# Patient Record
Sex: FEMALE | Race: WHITE | NOT HISPANIC OR LATINO | URBAN - NONMETROPOLITAN AREA
[De-identification: names, ages, dates, MRNs, and addresses within clinical notes are randomized per-mention and may not be internally consistent; named-entity substitution may affect disease eponyms.]

---

## 2020-06-04 ENCOUNTER — OFFICE VISIT (OUTPATIENT)
Dept: URBAN - NONMETROPOLITAN AREA CLINIC 2 | Facility: CLINIC | Age: 60
End: 2020-06-04

## 2020-06-09 ENCOUNTER — WEB ENCOUNTER (OUTPATIENT)
Dept: URBAN - NONMETROPOLITAN AREA CLINIC 2 | Facility: CLINIC | Age: 60
End: 2020-06-09

## 2020-06-11 ENCOUNTER — TELEPHONE ENCOUNTER (OUTPATIENT)
Dept: URBAN - NONMETROPOLITAN AREA CLINIC 2 | Facility: CLINIC | Age: 60
End: 2020-06-11

## 2020-06-11 RX ORDER — FAMOTIDINE 40 MG/1
TAKE 1 TABLET (40 MG) BY ORAL ROUTE 2 TIMES PER DAY TABLET ORAL 2
Qty: 180 TABLET | Refills: 3

## 2020-06-29 ENCOUNTER — OFFICE VISIT (OUTPATIENT)
Dept: URBAN - NONMETROPOLITAN AREA CLINIC 2 | Facility: CLINIC | Age: 60
End: 2020-06-29

## 2020-06-29 ENCOUNTER — OFFICE VISIT (OUTPATIENT)
Dept: URBAN - NONMETROPOLITAN AREA CLINIC 2 | Facility: CLINIC | Age: 60
End: 2020-06-29
Payer: MEDICARE

## 2020-06-29 DIAGNOSIS — K21.9 GERD (GASTROESOPHAGEAL REFLUX DISEASE): ICD-10-CM

## 2020-06-29 DIAGNOSIS — Z12.11 ROUTINE COLON: ICD-10-CM

## 2020-06-29 DIAGNOSIS — R19.7 DIARRHEA: ICD-10-CM

## 2020-06-29 PROCEDURE — 3017F COLORECTAL CA SCREEN DOC REV: CPT | Performed by: INTERNAL MEDICINE

## 2020-06-29 PROCEDURE — G9903 PT SCRN TBCO ID AS NON USER: HCPCS | Performed by: INTERNAL MEDICINE

## 2020-06-29 PROCEDURE — 1036F TOBACCO NON-USER: CPT | Performed by: INTERNAL MEDICINE

## 2020-06-29 PROCEDURE — G8427 DOCREV CUR MEDS BY ELIG CLIN: HCPCS | Performed by: INTERNAL MEDICINE

## 2020-06-29 PROCEDURE — 99213 OFFICE O/P EST LOW 20 MIN: CPT | Performed by: INTERNAL MEDICINE

## 2020-06-29 RX ORDER — RIFAXIMIN 550 MG/1
1 TABLET TABLET ORAL THREE TIMES A DAY
Qty: 42 TABLET | Refills: 2 | OUTPATIENT
Start: 2020-06-29 | End: 2020-08-10

## 2020-06-29 RX ORDER — FLUTICASONE PROPIONATE 50 UG/1
SPRAY 2 SPRAYS (100 MCG) IN EACH NOSTRIL BY INTRANASAL ROUTE ONCE DAILY AS NEEDED SPRAY, METERED NASAL 1
Qty: 1 | Refills: 0 | Status: ACTIVE | COMMUNITY
Start: 1900-01-01

## 2020-06-29 RX ORDER — CLINDAMYCIN HYDROCHLORIDE 300 MG/1
TAKE 2 CAPSULES BY MOUTH BEFORE PROCEDURE CAPSULE ORAL
Qty: 0 | Refills: 0 | Status: ACTIVE | COMMUNITY
Start: 1900-01-01

## 2020-06-29 RX ORDER — DEXTROSE 4 G
TAKE 1 TABLET (10 MG) BY ORAL ROUTE ONCE DAILY TABLET,CHEWABLE ORAL 1
Qty: 0 | Refills: 0 | Status: ACTIVE | COMMUNITY
Start: 1900-01-01

## 2020-06-29 RX ORDER — AMLODIPINE BESYLATE 5 MG/1
TAKE 1 TABLET (5 MG) BY ORAL ROUTE ONCE DAILY TABLET ORAL 1
Qty: 0 | Refills: 0 | Status: ACTIVE | COMMUNITY
Start: 1900-01-01

## 2020-06-29 RX ORDER — METFORMIN HYDROCHLORIDE 1000 MG/1
TAKE 1 TABLET (1,000 MG) BY ORAL ROUTE 2 TIMES PER DAY WITH MORNING AND EVENING MEALS TABLET, COATED ORAL 2
Qty: 0 | Refills: 0 | Status: ACTIVE | COMMUNITY
Start: 1900-01-01

## 2020-06-29 RX ORDER — OMEPRAZOLE 40 MG/1
TAKE 1 CAPSULE BY ORAL ROUTE EVERY OTHER DAY CAPSULE, DELAYED RELEASE PELLETS ORAL
Qty: 0 | Refills: 0 | Status: ACTIVE | COMMUNITY
Start: 1900-01-01

## 2020-06-29 RX ORDER — FAMOTIDINE 40 MG/1
TAKE 1 TABLET (40 MG) BY ORAL ROUTE 2 TIMES PER DAY TABLET ORAL 2
Qty: 180 TABLET | Refills: 3 | Status: ACTIVE | COMMUNITY

## 2020-06-29 RX ORDER — MULTIVITAMIN WITH IRON
TAKE 1 TABLET BY ORAL ROUTE DAILY TABLET ORAL 1
Qty: 0 | Refills: 0 | Status: ACTIVE | COMMUNITY
Start: 1900-01-01

## 2020-06-29 RX ORDER — ACETAMINOPHEN 500 MG/1
TABLET, FILM COATED ORAL
Qty: 0 | Refills: 0 | Status: ACTIVE | COMMUNITY
Start: 1900-01-01

## 2020-06-29 RX ORDER — ATORVASTATIN CALCIUM 20 MG/1
TAKE 1 TABLET (20 MG) BY ORAL ROUTE ONCE DAILY TABLET, FILM COATED ORAL 1
Qty: 0 | Refills: 0 | Status: ACTIVE | COMMUNITY
Start: 1900-01-01

## 2020-06-29 RX ORDER — ASPIRIN 81 MG/1
TAKE 1 TABLET (81 MG) BY ORAL ROUTE ONCE DAILY TABLET, COATED ORAL 1
Qty: 0 | Refills: 0 | Status: ACTIVE | COMMUNITY
Start: 1900-01-01

## 2020-06-29 RX ORDER — IRBESARTAN AND HYDROCHLOROTHIAZIDE 150; 12.5 MG/1; MG/1
TAKE 2 TABLETS BY ORAL ROUTE ONCE DAILY TABLET ORAL 1
Qty: 0 | Refills: 0 | Status: ACTIVE | COMMUNITY
Start: 1900-01-01

## 2020-06-29 RX ORDER — IBUPROFEN 400 MG/1
TABLET ORAL
Qty: 0 | Refills: 0 | Status: ACTIVE | COMMUNITY
Start: 1900-01-01

## 2020-06-29 NOTE — HPI-TODAY'S VISIT:
6/29/2020 Patrick presents for follow up of reflux and IBS-D. Since her last visit she stopped PPI and is on famotidine 40mg BID with control of her symptoms. She has an occasional breakthrough when she over eats. This and the florastor have helped her diarrhea but she still has intermittent episodes of loose stool and urgency. Today she is doing better but still with some diarrhea. MB  3/5/2020  Patrick presents for colonoscopy follow up. Her Colonoscopy reveals a benign polyp and normal random biopsies. She continues to struggle with 3-4 loose Bms in the am. She thinks it is related to the prilosec 40mg daily. She would like to wean off but is on NSAIDs PRN for recent joint replacement. She is not taking anything for the diarrhea. She has no symptoms of reflux on omeprazole. Today she is struggling with her symptoms. MB

## 2020-07-02 ENCOUNTER — WEB ENCOUNTER (OUTPATIENT)
Dept: URBAN - NONMETROPOLITAN AREA CLINIC 2 | Facility: CLINIC | Age: 60
End: 2020-07-02

## 2020-08-10 ENCOUNTER — OFFICE VISIT (OUTPATIENT)
Dept: URBAN - NONMETROPOLITAN AREA CLINIC 2 | Facility: CLINIC | Age: 60
End: 2020-08-10
Payer: MEDICARE

## 2020-08-10 DIAGNOSIS — R19.7 DIARRHEA: ICD-10-CM

## 2020-08-10 DIAGNOSIS — Z12.11 ROUTINE COLON: ICD-10-CM

## 2020-08-10 DIAGNOSIS — K21.9 GERD (GASTROESOPHAGEAL REFLUX DISEASE): ICD-10-CM

## 2020-08-10 PROCEDURE — G8427 DOCREV CUR MEDS BY ELIG CLIN: HCPCS | Performed by: NURSE PRACTITIONER

## 2020-08-10 PROCEDURE — 3017F COLORECTAL CA SCREEN DOC REV: CPT | Performed by: NURSE PRACTITIONER

## 2020-08-10 PROCEDURE — 1036F TOBACCO NON-USER: CPT | Performed by: NURSE PRACTITIONER

## 2020-08-10 PROCEDURE — 99213 OFFICE O/P EST LOW 20 MIN: CPT | Performed by: NURSE PRACTITIONER

## 2020-08-10 RX ORDER — IBUPROFEN 400 MG/1
TABLET ORAL
Qty: 0 | Refills: 0 | Status: ACTIVE | COMMUNITY
Start: 1900-01-01

## 2020-08-10 RX ORDER — ATORVASTATIN CALCIUM 20 MG/1
TAKE 1 TABLET (20 MG) BY ORAL ROUTE ONCE DAILY TABLET, FILM COATED ORAL 1
Qty: 0 | Refills: 0 | Status: ACTIVE | COMMUNITY
Start: 1900-01-01

## 2020-08-10 RX ORDER — DEXTROSE 4 G
TAKE 1 TABLET (10 MG) BY ORAL ROUTE ONCE DAILY TABLET,CHEWABLE ORAL 1
Qty: 0 | Refills: 0 | Status: ACTIVE | COMMUNITY
Start: 1900-01-01

## 2020-08-10 RX ORDER — METFORMIN HYDROCHLORIDE 1000 MG/1
TAKE 1 TABLET (1,000 MG) BY ORAL ROUTE 2 TIMES PER DAY WITH MORNING AND EVENING MEALS TABLET, COATED ORAL 2
Qty: 0 | Refills: 0 | Status: ACTIVE | COMMUNITY
Start: 1900-01-01

## 2020-08-10 RX ORDER — OMEPRAZOLE 40 MG/1
TAKE 1 CAPSULE BY ORAL ROUTE EVERY OTHER DAY CAPSULE, DELAYED RELEASE PELLETS ORAL
Qty: 0 | Refills: 0 | Status: ACTIVE | COMMUNITY
Start: 1900-01-01

## 2020-08-10 RX ORDER — MULTIVITAMIN WITH IRON
TAKE 1 TABLET BY ORAL ROUTE DAILY TABLET ORAL 1
Qty: 0 | Refills: 0 | Status: ACTIVE | COMMUNITY
Start: 1900-01-01

## 2020-08-10 RX ORDER — FLUTICASONE PROPIONATE 50 UG/1
SPRAY 2 SPRAYS (100 MCG) IN EACH NOSTRIL BY INTRANASAL ROUTE ONCE DAILY AS NEEDED SPRAY, METERED NASAL 1
Qty: 1 | Refills: 0 | Status: ACTIVE | COMMUNITY
Start: 1900-01-01

## 2020-08-10 RX ORDER — RIFAXIMIN 550 MG/1
1 TABLET TABLET ORAL THREE TIMES A DAY
Qty: 42 TABLET | Refills: 2 | Status: ACTIVE | COMMUNITY
Start: 2020-06-29 | End: 2020-08-10

## 2020-08-10 RX ORDER — AMLODIPINE BESYLATE 5 MG/1
TAKE 1 TABLET (5 MG) BY ORAL ROUTE ONCE DAILY TABLET ORAL 1
Qty: 0 | Refills: 0 | Status: ACTIVE | COMMUNITY
Start: 1900-01-01

## 2020-08-10 RX ORDER — ASPIRIN 81 MG/1
TAKE 1 TABLET (81 MG) BY ORAL ROUTE ONCE DAILY TABLET, COATED ORAL 1
Qty: 0 | Refills: 0 | Status: ACTIVE | COMMUNITY
Start: 1900-01-01

## 2020-08-10 RX ORDER — FAMOTIDINE 40 MG/1
TAKE 1 TABLET (40 MG) BY ORAL ROUTE 2 TIMES PER DAY TABLET ORAL 2
Qty: 180 TABLET | Refills: 3 | Status: ACTIVE | COMMUNITY

## 2020-08-10 RX ORDER — ACETAMINOPHEN 500 MG/1
TABLET, FILM COATED ORAL
Qty: 0 | Refills: 0 | Status: ACTIVE | COMMUNITY
Start: 1900-01-01

## 2020-08-10 RX ORDER — RIFAXIMIN 550 MG/1
1 TABLET TABLET ORAL THREE TIMES A DAY
Qty: 42 TABLET | Refills: 2 | OUTPATIENT
Start: 2020-08-10 | End: 2020-09-21

## 2020-08-10 RX ORDER — IRBESARTAN AND HYDROCHLOROTHIAZIDE 150; 12.5 MG/1; MG/1
TAKE 2 TABLETS BY ORAL ROUTE ONCE DAILY TABLET ORAL 1
Qty: 0 | Refills: 0 | Status: ACTIVE | COMMUNITY
Start: 1900-01-01

## 2020-08-10 RX ORDER — CLINDAMYCIN HYDROCHLORIDE 300 MG/1
TAKE 2 CAPSULES BY MOUTH BEFORE PROCEDURE CAPSULE ORAL
Qty: 0 | Refills: 0 | Status: ACTIVE | COMMUNITY
Start: 1900-01-01

## 2020-08-10 NOTE — HPI-TODAY'S VISIT:
8/10/2020 Patrick presents for follow up of reflux and IBS-D. She is doing well on famotidine 40mg daily. She is s/p xifaxan with improvement initially but recurrent symptoms. She is on florastor everyother day due to cost. Today she is doing well otherwise but still struggling with her diarrhea. MB  6/29/2020 Patrick presents for follow up of reflux and IBS-D. Since her last visit she stopped PPI and is on famotidine 40mg BID with control of her symptoms. She has an occasional breakthrough when she over eats. This and the florastor have helped her diarrhea but she still has intermittent episodes of loose stool and urgency. Today she is doing better but still with some diarrhea. MB  3/5/2020  Patrick presents for colonoscopy follow up. Her Colonoscopy reveals a benign polyp and normal random biopsies. She continues to struggle with 3-4 loose Bms in the am. She thinks it is related to the prilosec 40mg daily. She would like to wean off but is on NSAIDs PRN for recent joint replacement. She is not taking anything for the diarrhea. She has no symptoms of reflux on omeprazole. Today she is struggling with her symptoms. MB

## 2020-11-02 ENCOUNTER — OFFICE VISIT (OUTPATIENT)
Dept: URBAN - NONMETROPOLITAN AREA CLINIC 2 | Facility: CLINIC | Age: 60
End: 2020-11-02
Payer: MEDICARE

## 2020-11-02 VITALS
DIASTOLIC BLOOD PRESSURE: 74 MMHG | WEIGHT: 246 LBS | SYSTOLIC BLOOD PRESSURE: 144 MMHG | BODY MASS INDEX: 40.98 KG/M2 | TEMPERATURE: 97.6 F | HEIGHT: 65 IN | HEART RATE: 86 BPM

## 2020-11-02 DIAGNOSIS — K21.9 GERD (GASTROESOPHAGEAL REFLUX DISEASE): ICD-10-CM

## 2020-11-02 DIAGNOSIS — Z12.11 ROUTINE COLON: ICD-10-CM

## 2020-11-02 DIAGNOSIS — K76.0 FATTY LIVER: ICD-10-CM

## 2020-11-02 DIAGNOSIS — R19.7 DIARRHEA: ICD-10-CM

## 2020-11-02 PROCEDURE — G8427 DOCREV CUR MEDS BY ELIG CLIN: HCPCS | Performed by: NURSE PRACTITIONER

## 2020-11-02 PROCEDURE — 99213 OFFICE O/P EST LOW 20 MIN: CPT | Performed by: NURSE PRACTITIONER

## 2020-11-02 PROCEDURE — 3017F COLORECTAL CA SCREEN DOC REV: CPT | Performed by: NURSE PRACTITIONER

## 2020-11-02 PROCEDURE — 1036F TOBACCO NON-USER: CPT | Performed by: NURSE PRACTITIONER

## 2020-11-02 RX ORDER — FAMOTIDINE 40 MG/1
TAKE 1 TABLET (40 MG) BY ORAL ROUTE 2 TIMES PER DAY TABLET ORAL 2
Qty: 180 TABLET | Refills: 3 | Status: ACTIVE | COMMUNITY

## 2020-11-02 RX ORDER — ASPIRIN 81 MG/1
TAKE 1 TABLET (81 MG) BY ORAL ROUTE ONCE DAILY TABLET, COATED ORAL 1
Qty: 0 | Refills: 0 | Status: ACTIVE | COMMUNITY
Start: 1900-01-01

## 2020-11-02 RX ORDER — CLINDAMYCIN HYDROCHLORIDE 300 MG/1
TAKE 2 CAPSULES BY MOUTH BEFORE PROCEDURE CAPSULE ORAL
Qty: 0 | Refills: 0 | Status: ON HOLD | COMMUNITY
Start: 1900-01-01

## 2020-11-02 RX ORDER — ATORVASTATIN CALCIUM 20 MG/1
TAKE 1 TABLET (20 MG) BY ORAL ROUTE ONCE DAILY TABLET, FILM COATED ORAL 1
Qty: 0 | Refills: 0 | Status: ACTIVE | COMMUNITY
Start: 1900-01-01

## 2020-11-02 RX ORDER — OMEPRAZOLE 40 MG/1
TAKE 1 CAPSULE BY ORAL ROUTE EVERY OTHER DAY CAPSULE, DELAYED RELEASE PELLETS ORAL
Qty: 0 | Refills: 0 | Status: ON HOLD | COMMUNITY
Start: 1900-01-01

## 2020-11-02 RX ORDER — IRBESARTAN AND HYDROCHLOROTHIAZIDE 150; 12.5 MG/1; MG/1
TAKE 2 TABLETS BY ORAL ROUTE ONCE DAILY TABLET ORAL 1
Qty: 0 | Refills: 0 | Status: ACTIVE | COMMUNITY
Start: 1900-01-01

## 2020-11-02 RX ORDER — DEXTROSE 4 G
TAKE 1 TABLET (10 MG) BY ORAL ROUTE ONCE DAILY TABLET,CHEWABLE ORAL 1
Qty: 0 | Refills: 0 | Status: ACTIVE | COMMUNITY
Start: 1900-01-01

## 2020-11-02 RX ORDER — ACETAMINOPHEN 500 MG/1
TABLET, FILM COATED ORAL
Qty: 0 | Refills: 0 | Status: ON HOLD | COMMUNITY
Start: 1900-01-01

## 2020-11-02 RX ORDER — MULTIVITAMIN WITH IRON
TAKE 1 TABLET BY ORAL ROUTE DAILY TABLET ORAL 1
Qty: 0 | Refills: 0 | Status: ON HOLD | COMMUNITY
Start: 1900-01-01

## 2020-11-02 RX ORDER — AMLODIPINE BESYLATE 5 MG/1
TAKE 1 TABLET (5 MG) BY ORAL ROUTE ONCE DAILY TABLET ORAL 1
Qty: 0 | Refills: 0 | Status: ACTIVE | COMMUNITY
Start: 1900-01-01

## 2020-11-02 RX ORDER — FLUTICASONE PROPIONATE 50 UG/1
SPRAY 2 SPRAYS (100 MCG) IN EACH NOSTRIL BY INTRANASAL ROUTE ONCE DAILY AS NEEDED SPRAY, METERED NASAL 1
Qty: 1 | Refills: 0 | Status: ACTIVE | COMMUNITY
Start: 1900-01-01

## 2020-11-02 RX ORDER — IBUPROFEN 400 MG/1
TABLET ORAL
Qty: 0 | Refills: 0 | Status: ACTIVE | COMMUNITY
Start: 1900-01-01

## 2020-11-02 RX ORDER — METFORMIN HYDROCHLORIDE 1000 MG/1
TAKE 1 TABLET (1,000 MG) BY ORAL ROUTE 2 TIMES PER DAY WITH MORNING AND EVENING MEALS TABLET, COATED ORAL 2
Qty: 0 | Refills: 0 | Status: ACTIVE | COMMUNITY
Start: 1900-01-01

## 2020-11-02 NOTE — HPI-TODAY'S VISIT:
3/5/2020  Patrick presents for colonoscopy follow up. Her Colonoscopy reveals a benign polyp and normal random biopsies. She continues to struggle with 3-4 loose Bms in the am. She thinks it is related to the prilosec 40mg daily. She would like to wean off but is on NSAIDs PRN for recent joint replacement. She is not taking anything for the diarrhea. She has no symptoms of reflux on omeprazole. Today she is struggling with her symptoms. MB  6/29/2020 Patrick presents for follow up of reflux and IBS-D. Since her last visit she stopped PPI and is on famotidine 40mg BID with control of her symptoms. She has an occasional breakthrough when she over eats. This and the florastor have helped her diarrhea but she still has intermittent episodes of loose stool and urgency. Today she is doing better but still with some diarrhea. MB 8/10/2020 Patrick presents for follow up of reflux and IBS-D. She is doing well on famotidine 40mg daily. She is s/p xifaxan with improvement initially but recurrent symptoms. She is on florastor everyother day due to cost. Today she is doing well otherwise but still struggling with her diarrhea. MB 11/2/2020 Mrs. Glover presents for follow-up of reflux, IBS-D, and fatty liver.  Since her last visit she has been doing well.  We did repeat the Xifaxan which helped her diarrhea.  She still has occasional flares every 1 to 3 weeks, she has not used the Imodium yet.  The IBgard seem to increase her reflux, she is doing well on famotidine 40 mg twice daily.  Today she is doing well otherwise with no new GI complaints, but she is working on weight loss for her fatty liver, she has repeat lab work scheduled this winter with Dr. Minor.  MB

## 2021-02-02 ENCOUNTER — TELEPHONE ENCOUNTER (OUTPATIENT)
Dept: URBAN - NONMETROPOLITAN AREA CLINIC 2 | Facility: CLINIC | Age: 61
End: 2021-02-02

## 2021-02-02 RX ORDER — FAMOTIDINE 40 MG/1
1 TAB QD BEFORE SUPPER TABLET ORAL QD
Qty: 90 TABLET | Refills: 3

## 2021-05-03 ENCOUNTER — OFFICE VISIT (OUTPATIENT)
Dept: URBAN - NONMETROPOLITAN AREA CLINIC 13 | Facility: CLINIC | Age: 61
End: 2021-05-03
Payer: MEDICARE

## 2021-05-03 VITALS
BODY MASS INDEX: 41.65 KG/M2 | HEART RATE: 93 BPM | WEIGHT: 250 LBS | DIASTOLIC BLOOD PRESSURE: 85 MMHG | TEMPERATURE: 96.8 F | SYSTOLIC BLOOD PRESSURE: 169 MMHG | HEIGHT: 65 IN

## 2021-05-03 DIAGNOSIS — K76.0 FATTY LIVER: ICD-10-CM

## 2021-05-03 DIAGNOSIS — K21.9 GERD (GASTROESOPHAGEAL REFLUX DISEASE): ICD-10-CM

## 2021-05-03 DIAGNOSIS — Z12.11 ROUTINE COLON: ICD-10-CM

## 2021-05-03 DIAGNOSIS — R19.7 DIARRHEA: ICD-10-CM

## 2021-05-03 PROCEDURE — 99214 OFFICE O/P EST MOD 30 MIN: CPT | Performed by: INTERNAL MEDICINE

## 2021-05-03 RX ORDER — MULTIVITAMIN WITH IRON
TAKE 1 TABLET BY ORAL ROUTE DAILY TABLET ORAL 1
Qty: 0 | Refills: 0 | Status: ON HOLD | COMMUNITY
Start: 1900-01-01

## 2021-05-03 RX ORDER — IRBESARTAN AND HYDROCHLOROTHIAZIDE 150; 12.5 MG/1; MG/1
TAKE 2 TABLETS BY ORAL ROUTE ONCE DAILY TABLET ORAL 1
Qty: 0 | Refills: 0 | Status: ACTIVE | COMMUNITY
Start: 1900-01-01

## 2021-05-03 RX ORDER — FLUTICASONE PROPIONATE 50 UG/1
SPRAY 2 SPRAYS (100 MCG) IN EACH NOSTRIL BY INTRANASAL ROUTE ONCE DAILY AS NEEDED SPRAY, METERED NASAL 1
Qty: 1 | Refills: 0 | Status: ACTIVE | COMMUNITY
Start: 1900-01-01

## 2021-05-03 RX ORDER — ACETAMINOPHEN 500 MG/1
TABLET, FILM COATED ORAL
Qty: 0 | Refills: 0 | Status: ON HOLD | COMMUNITY
Start: 1900-01-01

## 2021-05-03 RX ORDER — CLINDAMYCIN HYDROCHLORIDE 300 MG/1
TAKE 2 CAPSULES BY MOUTH BEFORE PROCEDURE CAPSULE ORAL
Qty: 0 | Refills: 0 | Status: ON HOLD | COMMUNITY
Start: 1900-01-01

## 2021-05-03 RX ORDER — ASPIRIN 81 MG/1
TAKE 1 TABLET (81 MG) BY ORAL ROUTE ONCE DAILY TABLET, COATED ORAL 1
Qty: 0 | Refills: 0 | Status: ACTIVE | COMMUNITY
Start: 1900-01-01

## 2021-05-03 RX ORDER — IBUPROFEN 400 MG/1
TABLET ORAL
Qty: 0 | Refills: 0 | Status: ACTIVE | COMMUNITY
Start: 1900-01-01

## 2021-05-03 RX ORDER — DEXTROSE 4 G
TAKE 1 TABLET (10 MG) BY ORAL ROUTE ONCE DAILY TABLET,CHEWABLE ORAL 1
Qty: 0 | Refills: 0 | Status: ACTIVE | COMMUNITY
Start: 1900-01-01

## 2021-05-03 RX ORDER — ATORVASTATIN CALCIUM 20 MG/1
TAKE 1 TABLET (20 MG) BY ORAL ROUTE ONCE DAILY TABLET, FILM COATED ORAL 1
Qty: 0 | Refills: 0 | Status: ACTIVE | COMMUNITY
Start: 1900-01-01

## 2021-05-03 RX ORDER — FAMOTIDINE 40 MG/1
1 TAB QD BEFORE SUPPER TABLET ORAL QD
Qty: 90 TABLET | Refills: 3 | Status: ACTIVE | COMMUNITY

## 2021-05-03 RX ORDER — METFORMIN HYDROCHLORIDE 1000 MG/1
TAKE 1 TABLET (1,000 MG) BY ORAL ROUTE 2 TIMES PER DAY WITH MORNING AND EVENING MEALS TABLET, COATED ORAL 2
Qty: 0 | Refills: 0 | Status: ACTIVE | COMMUNITY
Start: 1900-01-01

## 2021-05-03 RX ORDER — OMEPRAZOLE 40 MG/1
TAKE 1 CAPSULE BY ORAL ROUTE EVERY OTHER DAY CAPSULE, DELAYED RELEASE PELLETS ORAL
Qty: 0 | Refills: 0 | Status: ON HOLD | COMMUNITY
Start: 1900-01-01

## 2021-05-03 RX ORDER — AMLODIPINE BESYLATE 5 MG/1
TAKE 1 TABLET (5 MG) BY ORAL ROUTE ONCE DAILY TABLET ORAL 1
Qty: 0 | Refills: 0 | Status: ACTIVE | COMMUNITY
Start: 1900-01-01

## 2021-05-03 NOTE — HPI-TODAY'S VISIT:
3/5/2020  Patrick presents for colonoscopy follow up. Her Colonoscopy reveals a benign polyp and normal random biopsies. She continues to struggle with 3-4 loose Bms in the am. She thinks it is related to the prilosec 40mg daily. She would like to wean off but is on NSAIDs PRN for recent joint replacement. She is not taking anything for the diarrhea. She has no symptoms of reflux on omeprazole. Today she is struggling with her symptoms. MB   6/29/2020 Patrick presents for follow up of reflux and IBS-D. Since her last visit she stopped PPI and is on famotidine 40mg BID with control of her symptoms. She has an occasional breakthrough when she over eats. This and the florastor have helped her diarrhea but she still has intermittent episodes of loose stool and urgency. Today she is doing better but still with some diarrhea. MB  8/10/2020 Patrick presents for follow up of reflux and IBS-D. She is doing well on famotidine 40mg daily. She is s/p xifaxan with improvement initially but recurrent symptoms. She is on florastor everyother day due to cost. Today she is doing well otherwise but still struggling with her diarrhea. MB  11/2/2020 Mrs. Glover presents for follow-up of reflux, IBS-D, and fatty liver.  Since her last visit she has been doing well.  We did repeat the Xifaxan which helped her diarrhea.  She still has occasional flares every 1 to 3 weeks, she has not used the Imodium yet.  The IBgard seem to increase her reflux, she is doing well on famotidine 40 mg twice daily.  Today she is doing well otherwise with no new GI complaints, but she is working on weight loss for her fatty liver, she has repeat lab work scheduled this winter with Dr. Minor.  MB   5/3/2021 Rox presents for follow-up of reflux and irritable bowel syndrome.  Since her last visit she has been doing about the same.  She is on Pepcid 40 mg twice daily.  She only has occasional heartburn breakthrough when she overeats.  Her bowels have been fairly normal but she still has days of urgency and diarrhea with 1-3 bowel movements in the morning.  She does think the Florastor is helpful.  Xifaxan improves her symptoms if she is flaring.  She agrees her diet likely plays a role and would like to look into the FODMAP diet.  Today she is doing well with no new GI complaints.  MB

## 2021-06-28 ENCOUNTER — WEB ENCOUNTER (OUTPATIENT)
Dept: URBAN - NONMETROPOLITAN AREA CLINIC 2 | Facility: CLINIC | Age: 61
End: 2021-06-28

## 2021-06-28 RX ORDER — FAMOTIDINE 40 MG/1
TAKE 1 TABLET BY MOUTH TWICE A DAY TABLET ORAL TWICE A DAY
Qty: 180 TABLET | Refills: 3

## 2021-08-04 ENCOUNTER — WEB ENCOUNTER (OUTPATIENT)
Dept: URBAN - NONMETROPOLITAN AREA CLINIC 13 | Facility: CLINIC | Age: 61
End: 2021-08-04

## 2021-08-04 RX ORDER — FAMOTIDINE 40 MG/1
TAKE 1 TABLET BY MOUTH TWICE A DAY TABLET ORAL TWICE A DAY
Qty: 180 TABLET | Refills: 3

## 2022-05-11 ENCOUNTER — LAB OUTSIDE AN ENCOUNTER (OUTPATIENT)
Dept: URBAN - NONMETROPOLITAN AREA CLINIC 2 | Facility: CLINIC | Age: 62
End: 2022-05-11

## 2022-05-11 ENCOUNTER — OFFICE VISIT (OUTPATIENT)
Dept: URBAN - NONMETROPOLITAN AREA CLINIC 2 | Facility: CLINIC | Age: 62
End: 2022-05-11
Payer: MEDICARE

## 2022-05-11 VITALS
WEIGHT: 245 LBS | TEMPERATURE: 97.5 F | BODY MASS INDEX: 40.82 KG/M2 | HEIGHT: 65 IN | SYSTOLIC BLOOD PRESSURE: 148 MMHG | HEART RATE: 81 BPM | DIASTOLIC BLOOD PRESSURE: 82 MMHG

## 2022-05-11 DIAGNOSIS — R11.0 NAUSEA: ICD-10-CM

## 2022-05-11 DIAGNOSIS — K21.9 GERD (GASTROESOPHAGEAL REFLUX DISEASE): ICD-10-CM

## 2022-05-11 DIAGNOSIS — K76.0 FATTY LIVER: ICD-10-CM

## 2022-05-11 DIAGNOSIS — R10.13 EPIGASTRIC ABDOMINAL PAIN: ICD-10-CM

## 2022-05-11 DIAGNOSIS — R19.7 DIARRHEA: ICD-10-CM

## 2022-05-11 DIAGNOSIS — Z12.11 ROUTINE COLON: ICD-10-CM

## 2022-05-11 PROCEDURE — 99214 OFFICE O/P EST MOD 30 MIN: CPT | Performed by: NURSE PRACTITIONER

## 2022-05-11 RX ORDER — OMEPRAZOLE 40 MG/1
TAKE 1 CAPSULE BY ORAL ROUTE EVERY OTHER DAY CAPSULE, DELAYED RELEASE PELLETS ORAL
Qty: 0 | Refills: 0 | Status: ON HOLD | COMMUNITY
Start: 1900-01-01

## 2022-05-11 RX ORDER — ATORVASTATIN CALCIUM 20 MG/1
TAKE 1 TABLET (20 MG) BY ORAL ROUTE ONCE DAILY TABLET, FILM COATED ORAL 1
Qty: 0 | Refills: 0 | Status: ACTIVE | COMMUNITY
Start: 1900-01-01

## 2022-05-11 RX ORDER — IBUPROFEN 400 MG/1
TABLET ORAL
Qty: 0 | Refills: 0 | Status: ON HOLD | COMMUNITY
Start: 1900-01-01

## 2022-05-11 RX ORDER — CLINDAMYCIN HYDROCHLORIDE 300 MG/1
TAKE 2 CAPSULES BY MOUTH BEFORE PROCEDURE CAPSULE ORAL
Qty: 0 | Refills: 0 | Status: ON HOLD | COMMUNITY
Start: 1900-01-01

## 2022-05-11 RX ORDER — ESOMEPRAZOLE MAGNESIUM 20 MG/1
1 CAPSULE CAPSULE, DELAYED RELEASE ORAL ONCE A DAY
Qty: 90 CAPSULE | Refills: 3 | OUTPATIENT
Start: 2022-05-11

## 2022-05-11 RX ORDER — IRBESARTAN AND HYDROCHLOROTHIAZIDE 150; 12.5 MG/1; MG/1
TAKE 2 TABLETS BY ORAL ROUTE ONCE DAILY TABLET ORAL 1
Qty: 0 | Refills: 0 | Status: ACTIVE | COMMUNITY
Start: 1900-01-01

## 2022-05-11 RX ORDER — MULTIVITAMIN WITH IRON
TAKE 1 TABLET BY ORAL ROUTE DAILY TABLET ORAL 1
Qty: 0 | Refills: 0 | Status: ON HOLD | COMMUNITY
Start: 1900-01-01

## 2022-05-11 RX ORDER — DEXTROSE 4 G
TAKE 1 TABLET (10 MG) BY ORAL ROUTE ONCE DAILY TABLET,CHEWABLE ORAL 1
Qty: 0 | Refills: 0 | Status: ACTIVE | COMMUNITY
Start: 1900-01-01

## 2022-05-11 RX ORDER — AMLODIPINE BESYLATE 5 MG/1
TAKE 1 TABLET (5 MG) BY ORAL ROUTE ONCE DAILY TABLET ORAL 1
Qty: 0 | Refills: 0 | Status: ACTIVE | COMMUNITY
Start: 1900-01-01

## 2022-05-11 RX ORDER — FAMOTIDINE 40 MG/1
TAKE 1 TABLET BY MOUTH TWICE A DAY TABLET ORAL TWICE A DAY
Qty: 180 TABLET | Refills: 3 | Status: ACTIVE | COMMUNITY

## 2022-05-11 RX ORDER — ASPIRIN 81 MG/1
TAKE 1 TABLET (81 MG) BY ORAL ROUTE ONCE DAILY TABLET, COATED ORAL 1
Qty: 0 | Refills: 0 | Status: ACTIVE | COMMUNITY
Start: 1900-01-01

## 2022-05-11 RX ORDER — METFORMIN HYDROCHLORIDE 1000 MG/1
TAKE 1 TABLET (1,000 MG) BY ORAL ROUTE 2 TIMES PER DAY WITH MORNING AND EVENING MEALS TABLET, COATED ORAL 2
Qty: 0 | Refills: 0 | Status: ACTIVE | COMMUNITY
Start: 1900-01-01

## 2022-05-11 RX ORDER — FLUTICASONE PROPIONATE 50 UG/1
SPRAY 2 SPRAYS (100 MCG) IN EACH NOSTRIL BY INTRANASAL ROUTE ONCE DAILY AS NEEDED SPRAY, METERED NASAL 1
Qty: 1 | Refills: 0 | Status: ACTIVE | COMMUNITY
Start: 1900-01-01

## 2022-05-11 RX ORDER — ACETAMINOPHEN 500 MG/1
TABLET, FILM COATED ORAL
Qty: 0 | Refills: 0 | Status: ON HOLD | COMMUNITY
Start: 1900-01-01

## 2022-05-11 NOTE — HPI-TODAY'S VISIT:
3/5/2020  Patrick presents for colonoscopy follow up. Her Colonoscopy reveals a benign polyp and normal random biopsies. She continues to struggle with 3-4 loose Bms in the am. She thinks it is related to the prilosec 40mg daily. She would like to wean off but is on NSAIDs PRN for recent joint replacement. She is not taking anything for the diarrhea. She has no symptoms of reflux on omeprazole. Today she is struggling with her symptoms. MB   6/29/2020 Patrick presents for follow up of reflux and IBS-D. Since her last visit she stopped PPI and is on famotidine 40mg BID with control of her symptoms. She has an occasional breakthrough when she over eats. This and the florastor have helped her diarrhea but she still has intermittent episodes of loose stool and urgency. Today she is doing better but still with some diarrhea. MB  8/10/2020 Patrick presents for follow up of reflux and IBS-D. She is doing well on famotidine 40mg daily. She is s/p xifaxan with improvement initially but recurrent symptoms. She is on florastor everyother day due to cost. Today she is doing well otherwise but still struggling with her diarrhea. MB  11/2/2020 Mrs. Golver presents for follow-up of reflux, IBS-D, and fatty liver.  Since her last visit she has been doing well.  We did repeat the Xifaxan which helped her diarrhea.  She still has occasional flares every 1 to 3 weeks, she has not used the Imodium yet.  The IBgard seem to increase her reflux, she is doing well on famotidine 40 mg twice daily.  Today she is doing well otherwise with no new GI complaints, but she is working on weight loss for her fatty liver, she has repeat lab work scheduled this winter with Dr. Minor.  MB   5/3/2021 Rox presents for follow-up of reflux and irritable bowel syndrome.  Since her last visit she has been doing about the same.  She is on Pepcid 40 mg twice daily.  She only has occasional heartburn breakthrough when she overeats.  Her bowels have been fairly normal but she still has days of urgency and diarrhea with 1-3 bowel movements in the morning.  She does think the Florastor is helpful.  Xifaxan improves her symptoms if she is flaring.  She agrees her diet likely plays a role and would like to look into the FODMAP diet.  Today she is doing well with no new GI complaints.  MB 5/11/2022 Rox presents for follow-up of reflux, diarrhea, and fatty liver.  Since her last visit she has had a flare of epigastric abdominal pain after meals with nausea bloating and reflux.  She is on famotidine 40 mg twice daily.  She does still have her gallbladder.  She is a diabetic.  She was started on Ozempic 2 months ago but her symptoms were increased before that.  She is never had an upper endoscopy by Dr. Vincent, she may have had one in the distant past by Dr. Cast.  She is never had a gastric emptying study.  Today she is her main complaint is postprandial bloating nausea and epigastric abdominal pain.  She does agree to start low-dose PPI, these have bothered her diarrhea in the past.  Her diarrhea is stable and Florastor daily.  Her last colonoscopy was in 2019 by Dr. Vincent with normal random biopsies and otherwise normal.  MB

## 2022-05-16 ENCOUNTER — TELEPHONE ENCOUNTER (OUTPATIENT)
Dept: URBAN - METROPOLITAN AREA CLINIC 111 | Facility: CLINIC | Age: 62
End: 2022-05-16

## 2022-05-16 ENCOUNTER — OFFICE VISIT (OUTPATIENT)
Dept: URBAN - NONMETROPOLITAN AREA SURGERY CENTER 1 | Facility: SURGERY CENTER | Age: 62
End: 2022-05-16
Payer: MEDICARE

## 2022-05-16 DIAGNOSIS — K31.89 ACQUIRED DEFORMITY OF DUODENUM: ICD-10-CM

## 2022-05-16 DIAGNOSIS — K22.89 DILATION OF ESOPHAGUS: ICD-10-CM

## 2022-05-16 PROCEDURE — G8907 PT DOC NO EVENTS ON DISCHARG: HCPCS | Performed by: INTERNAL MEDICINE

## 2022-05-16 PROCEDURE — 43239 EGD BIOPSY SINGLE/MULTIPLE: CPT | Performed by: INTERNAL MEDICINE

## 2022-05-19 ENCOUNTER — WEB ENCOUNTER (OUTPATIENT)
Dept: URBAN - NONMETROPOLITAN AREA CLINIC 2 | Facility: CLINIC | Age: 62
End: 2022-05-19

## 2022-05-20 ENCOUNTER — LAB OUTSIDE AN ENCOUNTER (OUTPATIENT)
Dept: URBAN - METROPOLITAN AREA CLINIC 92 | Facility: CLINIC | Age: 62
End: 2022-05-20

## 2022-05-20 ENCOUNTER — TELEPHONE ENCOUNTER (OUTPATIENT)
Dept: URBAN - METROPOLITAN AREA CLINIC 92 | Facility: CLINIC | Age: 62
End: 2022-05-20

## 2022-06-01 ENCOUNTER — WEB ENCOUNTER (OUTPATIENT)
Dept: URBAN - NONMETROPOLITAN AREA CLINIC 2 | Facility: CLINIC | Age: 62
End: 2022-06-01

## 2022-06-01 ENCOUNTER — TELEPHONE ENCOUNTER (OUTPATIENT)
Dept: URBAN - METROPOLITAN AREA CLINIC 92 | Facility: CLINIC | Age: 62
End: 2022-06-01

## 2022-06-06 ENCOUNTER — TELEPHONE ENCOUNTER (OUTPATIENT)
Dept: URBAN - METROPOLITAN AREA CLINIC 92 | Facility: CLINIC | Age: 62
End: 2022-06-06

## 2022-06-06 RX ORDER — FAMOTIDINE 40 MG/1
TAKE 1 TABLET BY MOUTH TWICE A DAY TABLET ORAL TWICE A DAY
Qty: 180 TABLET | Refills: 3

## 2022-06-11 ENCOUNTER — WEB ENCOUNTER (OUTPATIENT)
Dept: URBAN - NONMETROPOLITAN AREA CLINIC 2 | Facility: CLINIC | Age: 62
End: 2022-06-11

## 2022-06-13 ENCOUNTER — WEB ENCOUNTER (OUTPATIENT)
Dept: URBAN - NONMETROPOLITAN AREA CLINIC 2 | Facility: CLINIC | Age: 62
End: 2022-06-13

## 2022-06-14 ENCOUNTER — WEB ENCOUNTER (OUTPATIENT)
Dept: URBAN - NONMETROPOLITAN AREA CLINIC 2 | Facility: CLINIC | Age: 62
End: 2022-06-14

## 2022-06-15 ENCOUNTER — OFFICE VISIT (OUTPATIENT)
Dept: URBAN - METROPOLITAN AREA TELEHEALTH 2 | Facility: TELEHEALTH | Age: 62
End: 2022-06-15
Payer: MEDICARE

## 2022-06-15 ENCOUNTER — OFFICE VISIT (OUTPATIENT)
Dept: URBAN - METROPOLITAN AREA TELEHEALTH 2 | Facility: TELEHEALTH | Age: 62
End: 2022-06-15

## 2022-06-15 DIAGNOSIS — K74.60 HEPATIC CIRRHOSIS, UNSPECIFIED HEPATIC CIRRHOSIS TYPE, UNSPECIFIED WHETHER ASCITES PRESENT: ICD-10-CM

## 2022-06-15 DIAGNOSIS — R19.7 DIARRHEA: ICD-10-CM

## 2022-06-15 DIAGNOSIS — K21.9 GERD (GASTROESOPHAGEAL REFLUX DISEASE): ICD-10-CM

## 2022-06-15 DIAGNOSIS — R10.13 EPIGASTRIC ABDOMINAL PAIN: ICD-10-CM

## 2022-06-15 PROCEDURE — 99214 OFFICE O/P EST MOD 30 MIN: CPT | Performed by: INTERNAL MEDICINE

## 2022-06-15 RX ORDER — ASPIRIN 81 MG/1
TAKE 1 TABLET (81 MG) BY ORAL ROUTE ONCE DAILY TABLET, COATED ORAL 1
Qty: 0 | Refills: 0 | Status: ACTIVE | COMMUNITY
Start: 1900-01-01

## 2022-06-15 RX ORDER — ACETAMINOPHEN 500 MG/1
TABLET, FILM COATED ORAL
Qty: 0 | Refills: 0 | Status: ON HOLD | COMMUNITY
Start: 1900-01-01

## 2022-06-15 RX ORDER — IRBESARTAN AND HYDROCHLOROTHIAZIDE 150; 12.5 MG/1; MG/1
TAKE 2 TABLETS BY ORAL ROUTE ONCE DAILY TABLET ORAL 1
Qty: 0 | Refills: 0 | Status: ACTIVE | COMMUNITY
Start: 1900-01-01

## 2022-06-15 RX ORDER — ESOMEPRAZOLE MAGNESIUM 20 MG/1
1 CAPSULE CAPSULE, DELAYED RELEASE ORAL ONCE A DAY
Qty: 90 CAPSULE | Refills: 3 | Status: ACTIVE | COMMUNITY
Start: 2022-05-11

## 2022-06-15 RX ORDER — CLINDAMYCIN HYDROCHLORIDE 300 MG/1
TAKE 2 CAPSULES BY MOUTH BEFORE PROCEDURE CAPSULE ORAL
Qty: 0 | Refills: 0 | Status: ON HOLD | COMMUNITY
Start: 1900-01-01

## 2022-06-15 RX ORDER — ATORVASTATIN CALCIUM 20 MG/1
TAKE 1 TABLET (20 MG) BY ORAL ROUTE ONCE DAILY TABLET, FILM COATED ORAL 1
Qty: 0 | Refills: 0 | Status: ACTIVE | COMMUNITY
Start: 1900-01-01

## 2022-06-15 RX ORDER — FLUTICASONE PROPIONATE 50 UG/1
SPRAY 2 SPRAYS (100 MCG) IN EACH NOSTRIL BY INTRANASAL ROUTE ONCE DAILY AS NEEDED SPRAY, METERED NASAL 1
Qty: 1 | Refills: 0 | Status: ACTIVE | COMMUNITY
Start: 1900-01-01

## 2022-06-15 RX ORDER — DEXTROSE 4 G
TAKE 1 TABLET (10 MG) BY ORAL ROUTE ONCE DAILY TABLET,CHEWABLE ORAL 1
Qty: 0 | Refills: 0 | Status: ACTIVE | COMMUNITY
Start: 1900-01-01

## 2022-06-15 RX ORDER — OMEPRAZOLE 40 MG/1
TAKE 1 CAPSULE BY ORAL ROUTE EVERY OTHER DAY CAPSULE, DELAYED RELEASE PELLETS ORAL
Qty: 0 | Refills: 0 | Status: ON HOLD | COMMUNITY
Start: 1900-01-01

## 2022-06-15 RX ORDER — MULTIVITAMIN WITH IRON
TAKE 1 TABLET BY ORAL ROUTE DAILY TABLET ORAL 1
Qty: 0 | Refills: 0 | Status: ON HOLD | COMMUNITY
Start: 1900-01-01

## 2022-06-15 RX ORDER — FAMOTIDINE 40 MG/1
TAKE 1 TABLET BY MOUTH TWICE A DAY TABLET ORAL TWICE A DAY
Qty: 180 TABLET | Refills: 3 | Status: ACTIVE | COMMUNITY

## 2022-06-15 RX ORDER — IBUPROFEN 400 MG/1
TABLET ORAL
Qty: 0 | Refills: 0 | Status: ON HOLD | COMMUNITY
Start: 1900-01-01

## 2022-06-15 RX ORDER — AMLODIPINE BESYLATE 5 MG/1
TAKE 1 TABLET (5 MG) BY ORAL ROUTE ONCE DAILY TABLET ORAL 1
Qty: 0 | Refills: 0 | Status: ACTIVE | COMMUNITY
Start: 1900-01-01

## 2022-06-15 RX ORDER — METFORMIN HYDROCHLORIDE 1000 MG/1
TAKE 1 TABLET (1,000 MG) BY ORAL ROUTE 2 TIMES PER DAY WITH MORNING AND EVENING MEALS TABLET, COATED ORAL 2
Qty: 0 | Refills: 0 | Status: ACTIVE | COMMUNITY
Start: 1900-01-01

## 2022-06-15 NOTE — HPI-TODAY'S VISIT:
3/5/2020  Patrick presents for colonoscopy follow up. Her Colonoscopy reveals a benign polyp and normal random biopsies. She continues to struggle with 3-4 loose Bms in the am. She thinks it is related to the prilosec 40mg daily. She would like to wean off but is on NSAIDs PRN for recent joint replacement. She is not taking anything for the diarrhea. She has no symptoms of reflux on omeprazole. Today she is struggling with her symptoms. MB   6/29/2020 Patrick presents for follow up of reflux and IBS-D. Since her last visit she stopped PPI and is on famotidine 40mg BID with control of her symptoms. She has an occasional breakthrough when she over eats. This and the florastor have helped her diarrhea but she still has intermittent episodes of loose stool and urgency. Today she is doing better but still with some diarrhea. MB  8/10/2020 Patrick presents for follow up of reflux and IBS-D. She is doing well on famotidine 40mg daily. She is s/p xifaxan with improvement initially but recurrent symptoms. She is on florastor everyother day due to cost. Today she is doing well otherwise but still struggling with her diarrhea. MB  11/2/2020 Mrs. Glover presents for follow-up of reflux, IBS-D, and fatty liver.  Since her last visit she has been doing well.  We did repeat the Xifaxan which helped her diarrhea.  She still has occasional flares every 1 to 3 weeks, she has not used the Imodium yet.  The IBgard seem to increase her reflux, she is doing well on famotidine 40 mg twice daily.  Today she is doing well otherwise with no new GI complaints, but she is working on weight loss for her fatty liver, she has repeat lab work scheduled this winter with Dr. Minor.  MB   5/3/2021 Rox presents for follow-up of reflux and irritable bowel syndrome.  Since her last visit she has been doing about the same.  She is on Pepcid 40 mg twice daily.  She only has occasional heartburn breakthrough when she overeats.  Her bowels have been fairly normal but she still has days of urgency and diarrhea with 1-3 bowel movements in the morning.  She does think the Florastor is helpful.  Xifaxan improves her symptoms if she is flaring.  She agrees her diet likely plays a role and would like to look into the FODMAP diet.  Today she is doing well with no new GI complaints.  MB  5/11/2022 Rox presents for follow-up of reflux, diarrhea, and fatty liver.  Since her last visit she has had a flare of epigastric abdominal pain after meals with nausea bloating and reflux.  She is on famotidine 40 mg twice daily.  She does still have her gallbladder.  She is a diabetic.  She was started on Ozempic 2 months ago but her symptoms were increased before that.  She is never had an upper endoscopy by Dr. Vincent, she may have had one in the distant past by Dr. Cast.  She is never had a gastric emptying study.  Today she is her main complaint is postprandial bloating nausea and epigastric abdominal pain.  She does agree to start low-dose PPI, these have bothered her diarrhea in the past.  Her diarrhea is stable and Florastor daily.  Her last colonoscopy was in 2019 by Dr. Vincent with normal random biopsies and otherwise normal.  MB  6/15/2022 Rox presents for follow up of reflux, diarrhea, and fatty liver with cirrhosis. Since her last visit her US shows cirrhosis. Her contrasted CT confirms this. Her EGD reveals reflux and gastritis. Her GES is positive. She has not had her serologies yet. She has no sequela of the disease so far. She is meeting with nutrition for her gastroparesis this week. Her reflux is stable on pepcid 40mg and nexium in the am. Her stools are loose but at her baseline. Today she is doing well and wants to know if she should continue ozempic as it is likely contributing to her nausea and diarrhea. She wants to give this more time as it has helped her A1C significantly and try to mange her GP with nutrition. MB  This telehealth visit was provided at the patient's home.

## 2022-06-17 ENCOUNTER — WEB ENCOUNTER (OUTPATIENT)
Dept: URBAN - METROPOLITAN AREA TELEHEALTH 2 | Facility: TELEHEALTH | Age: 62
End: 2022-06-17

## 2022-06-17 ENCOUNTER — OFFICE VISIT (OUTPATIENT)
Dept: URBAN - METROPOLITAN AREA TELEHEALTH 2 | Facility: TELEHEALTH | Age: 62
End: 2022-06-17
Payer: MEDICARE

## 2022-06-17 VITALS — WEIGHT: 240 LBS | BODY MASS INDEX: 40.97 KG/M2 | HEIGHT: 64 IN

## 2022-06-17 DIAGNOSIS — K31.84 GASTROPARESIS: ICD-10-CM

## 2022-06-17 DIAGNOSIS — E66.01 MORBID OBESITY: ICD-10-CM

## 2022-06-17 DIAGNOSIS — K76.0 FATTY (CHANGE OF) LIVER: ICD-10-CM

## 2022-06-17 DIAGNOSIS — K21.9 ACID REFLUX: ICD-10-CM

## 2022-06-17 PROCEDURE — 97802 MEDICAL NUTRITION INDIV IN: CPT | Performed by: DIETITIAN, REGISTERED

## 2022-06-17 RX ORDER — CLINDAMYCIN HYDROCHLORIDE 300 MG/1
TAKE 2 CAPSULES BY MOUTH BEFORE PROCEDURE CAPSULE ORAL
Qty: 0 | Refills: 0 | Status: ON HOLD | COMMUNITY
Start: 1900-01-01

## 2022-06-17 RX ORDER — METFORMIN HYDROCHLORIDE 1000 MG/1
TAKE 1 TABLET (1,000 MG) BY ORAL ROUTE 2 TIMES PER DAY WITH MORNING AND EVENING MEALS TABLET, COATED ORAL 2
Qty: 0 | Refills: 0 | Status: ACTIVE | COMMUNITY
Start: 1900-01-01

## 2022-06-17 RX ORDER — IRBESARTAN AND HYDROCHLOROTHIAZIDE 150; 12.5 MG/1; MG/1
TAKE 2 TABLETS BY ORAL ROUTE ONCE DAILY TABLET ORAL 1
Qty: 0 | Refills: 0 | Status: ACTIVE | COMMUNITY
Start: 1900-01-01

## 2022-06-17 RX ORDER — MULTIVITAMIN WITH IRON
TAKE 1 TABLET BY ORAL ROUTE DAILY TABLET ORAL 1
Qty: 0 | Refills: 0 | Status: ON HOLD | COMMUNITY
Start: 1900-01-01

## 2022-06-17 RX ORDER — IBUPROFEN 400 MG/1
TABLET ORAL
Qty: 0 | Refills: 0 | Status: ON HOLD | COMMUNITY
Start: 1900-01-01

## 2022-06-17 RX ORDER — OMEPRAZOLE 40 MG/1
TAKE 1 CAPSULE BY ORAL ROUTE EVERY OTHER DAY CAPSULE, DELAYED RELEASE PELLETS ORAL
Qty: 0 | Refills: 0 | Status: ON HOLD | COMMUNITY
Start: 1900-01-01

## 2022-06-17 RX ORDER — ESOMEPRAZOLE MAGNESIUM 20 MG/1
1 CAPSULE CAPSULE, DELAYED RELEASE ORAL ONCE A DAY
Qty: 90 CAPSULE | Refills: 3 | Status: ACTIVE | COMMUNITY
Start: 2022-05-11

## 2022-06-17 RX ORDER — FLUTICASONE PROPIONATE 50 UG/1
SPRAY 2 SPRAYS (100 MCG) IN EACH NOSTRIL BY INTRANASAL ROUTE ONCE DAILY AS NEEDED SPRAY, METERED NASAL 1
Qty: 1 | Refills: 0 | Status: ACTIVE | COMMUNITY
Start: 1900-01-01

## 2022-06-17 RX ORDER — ASPIRIN 81 MG/1
TAKE 1 TABLET (81 MG) BY ORAL ROUTE ONCE DAILY TABLET, COATED ORAL 1
Qty: 0 | Refills: 0 | Status: ACTIVE | COMMUNITY
Start: 1900-01-01

## 2022-06-17 RX ORDER — FAMOTIDINE 40 MG/1
TAKE 1 TABLET BY MOUTH TWICE A DAY TABLET ORAL TWICE A DAY
Qty: 180 TABLET | Refills: 3 | Status: ACTIVE | COMMUNITY

## 2022-06-17 RX ORDER — DEXTROSE 4 G
TAKE 1 TABLET (10 MG) BY ORAL ROUTE ONCE DAILY TABLET,CHEWABLE ORAL 1
Qty: 0 | Refills: 0 | Status: ACTIVE | COMMUNITY
Start: 1900-01-01

## 2022-06-17 RX ORDER — AMLODIPINE BESYLATE 5 MG/1
TAKE 1 TABLET (5 MG) BY ORAL ROUTE ONCE DAILY TABLET ORAL 1
Qty: 0 | Refills: 0 | Status: ACTIVE | COMMUNITY
Start: 1900-01-01

## 2022-06-17 RX ORDER — ATORVASTATIN CALCIUM 20 MG/1
TAKE 1 TABLET (20 MG) BY ORAL ROUTE ONCE DAILY TABLET, FILM COATED ORAL 1
Qty: 0 | Refills: 0 | Status: ACTIVE | COMMUNITY
Start: 1900-01-01

## 2022-06-17 RX ORDER — ACETAMINOPHEN 500 MG/1
TABLET, FILM COATED ORAL
Qty: 0 | Refills: 0 | Status: ON HOLD | COMMUNITY
Start: 1900-01-01

## 2022-06-17 NOTE — HPI-TODAY'S VISIT:
Nutrition initial visit:  Last A1C was 6.2%.  Patient has been struggling with gastroparesis for past year plus.  Patient reports that 30 min to 1 hour after eating she gets pain.  Sometimes wakes up nauseated.  Frequent small BM.  Typical diet: 3/4 cup black coffee.   9am oatmeal old fashioned with butter and honey and walnuts.  12pm  20oz decaf tea unsweet small tossed salad (stevo, shredded carrots, grape tomatoes, onion, croutons, balsamic  vinagrette) [place] caviar a tortilla chips.   4-5pm watermelon.  7pm small hamburger allison on ww bun, lettuce, enrique onion , mustard and ketchup.  9:30pm  unsweet applesauce 1 judith cracker sheet.  40 oz water.

## 2022-06-29 ENCOUNTER — LAB OUTSIDE AN ENCOUNTER (OUTPATIENT)
Dept: URBAN - NONMETROPOLITAN AREA CLINIC 2 | Facility: CLINIC | Age: 62
End: 2022-06-29

## 2022-07-01 LAB
A/G RATIO: 1.8
ACTIN (SMOOTH MUSCLE) ANTIBODY: 7
ALBUMIN: 4.9
ALKALINE PHOSPHATASE: 87
ALT (SGPT): 46
ANA DIRECT: NEGATIVE
AST (SGOT): 58
BASO (ABSOLUTE): 0
BASOS: 1
BILIRUBIN, TOTAL: 1.1
BUN/CREATININE RATIO: 20
BUN: 15
C-REACTIVE PROTEIN, QUANT: 5
CALCIUM: 10.3
CARBON DIOXIDE, TOTAL: 25
CHLORIDE: 97
CREATININE: 0.75
DEAMIDATED GLIADIN ABS, IGA: 3
DEAMIDATED GLIADIN ABS, IGG: 2
EGFR: 89
ENDOMYSIAL ANTIBODY IGA: NEGATIVE
EOS (ABSOLUTE): 0.4
EOS: 4
FERRITIN, SERUM: 120
GGT: 116
GLOBULIN, TOTAL: 2.7
GLUCOSE: 156
HBSAG SCREEN: NEGATIVE
HCV AB: <0.1
HEMATOCRIT: 42.1
HEMATOLOGY COMMENTS:: (no result)
HEMOGLOBIN: 14
HEP A AB, IGM: NEGATIVE
HEP B CORE AB, IGM: NEGATIVE
IMMATURE CELLS: (no result)
IMMATURE GRANS (ABS): 0.1
IMMATURE GRANULOCYTES: 1
IMMUNOGLOBULIN A, QN, SERUM: 286
INR: 1
INTERPRETATION:: (no result)
IRON BIND.CAP.(TIBC): 334
IRON SATURATION: 29
IRON: 96
LIVER-KIDNEY MICROSOMAL AB: 2.4
LYMPHS (ABSOLUTE): 2
LYMPHS: 25
MCH: 29.9
MCHC: 33.3
MCV: 90
MITOCHONDRIAL (M2) ANTIBODY: <20
MONOCYTES(ABSOLUTE): 0.5
MONOCYTES: 6
NEUTROPHILS (ABSOLUTE): 5.1
NEUTROPHILS: 63
NRBC: (no result)
PLATELETS: 168
POTASSIUM: 4.2
PROTEIN, TOTAL: 7.6
PROTHROMBIN TIME: 10.9
RBC: 4.69
RDW: 13.8
SEDIMENTATION RATE-WESTERGREN: 31
SODIUM: 139
T-TRANSGLUTAMINASE (TTG) IGA: <2
T-TRANSGLUTAMINASE (TTG) IGG: 3
T4,FREE(DIRECT): 1.27
TSH: 2.39
UIBC: 238
WBC: 8.1

## 2022-07-05 ENCOUNTER — TELEPHONE ENCOUNTER (OUTPATIENT)
Dept: URBAN - METROPOLITAN AREA CLINIC 92 | Facility: CLINIC | Age: 62
End: 2022-07-05

## 2022-07-05 ENCOUNTER — WEB ENCOUNTER (OUTPATIENT)
Dept: URBAN - NONMETROPOLITAN AREA CLINIC 2 | Facility: CLINIC | Age: 62
End: 2022-07-05

## 2022-07-08 ENCOUNTER — LAB OUTSIDE AN ENCOUNTER (OUTPATIENT)
Dept: URBAN - NONMETROPOLITAN AREA CLINIC 2 | Facility: CLINIC | Age: 62
End: 2022-07-08

## 2022-07-09 LAB — AMMONIA, PLASMA: 59

## 2022-07-14 ENCOUNTER — OFFICE VISIT (OUTPATIENT)
Dept: URBAN - NONMETROPOLITAN AREA CLINIC 2 | Facility: CLINIC | Age: 62
End: 2022-07-14
Payer: MEDICARE

## 2022-07-14 VITALS
TEMPERATURE: 97.5 F | HEIGHT: 64 IN | BODY MASS INDEX: 39.09 KG/M2 | SYSTOLIC BLOOD PRESSURE: 176 MMHG | WEIGHT: 229 LBS | HEART RATE: 76 BPM | DIASTOLIC BLOOD PRESSURE: 86 MMHG

## 2022-07-14 DIAGNOSIS — R11.0 NAUSEA: ICD-10-CM

## 2022-07-14 DIAGNOSIS — K21.9 GERD (GASTROESOPHAGEAL REFLUX DISEASE): ICD-10-CM

## 2022-07-14 DIAGNOSIS — Z12.11 ROUTINE COLON: ICD-10-CM

## 2022-07-14 DIAGNOSIS — R19.7 DIARRHEA: ICD-10-CM

## 2022-07-14 DIAGNOSIS — R10.13 EPIGASTRIC ABDOMINAL PAIN: ICD-10-CM

## 2022-07-14 DIAGNOSIS — K74.60 HEPATIC CIRRHOSIS, UNSPECIFIED HEPATIC CIRRHOSIS TYPE, UNSPECIFIED WHETHER ASCITES PRESENT: ICD-10-CM

## 2022-07-14 DIAGNOSIS — K76.0 FATTY LIVER: ICD-10-CM

## 2022-07-14 DIAGNOSIS — E66.01 MORBID OBESITY: ICD-10-CM

## 2022-07-14 DIAGNOSIS — K31.84 GASTROPARESIS: ICD-10-CM

## 2022-07-14 PROCEDURE — 99214 OFFICE O/P EST MOD 30 MIN: CPT | Performed by: NURSE PRACTITIONER

## 2022-07-14 RX ORDER — ASPIRIN 81 MG/1
TAKE 1 TABLET (81 MG) BY ORAL ROUTE ONCE DAILY TABLET, COATED ORAL 1
Qty: 0 | Refills: 0 | Status: ACTIVE | COMMUNITY
Start: 1900-01-01

## 2022-07-14 RX ORDER — CLINDAMYCIN HYDROCHLORIDE 300 MG/1
TAKE 2 CAPSULES BY MOUTH BEFORE PROCEDURE CAPSULE ORAL
Qty: 0 | Refills: 0 | Status: ON HOLD | COMMUNITY
Start: 1900-01-01

## 2022-07-14 RX ORDER — ACETAMINOPHEN 500 MG/1
TABLET, FILM COATED ORAL
Qty: 0 | Refills: 0 | Status: ON HOLD | COMMUNITY
Start: 1900-01-01

## 2022-07-14 RX ORDER — OMEPRAZOLE 40 MG/1
TAKE 1 CAPSULE BY ORAL ROUTE EVERY OTHER DAY CAPSULE, DELAYED RELEASE PELLETS ORAL
Qty: 0 | Refills: 0 | Status: ON HOLD | COMMUNITY
Start: 1900-01-01

## 2022-07-14 RX ORDER — METFORMIN HYDROCHLORIDE 1000 MG/1
TAKE 1 TABLET (1,000 MG) BY ORAL ROUTE 2 TIMES PER DAY WITH MORNING AND EVENING MEALS TABLET, COATED ORAL 2
Qty: 0 | Refills: 0 | Status: ACTIVE | COMMUNITY
Start: 1900-01-01

## 2022-07-14 RX ORDER — IRBESARTAN AND HYDROCHLOROTHIAZIDE 150; 12.5 MG/1; MG/1
TAKE 2 TABLETS BY ORAL ROUTE ONCE DAILY TABLET ORAL 1
Qty: 0 | Refills: 0 | Status: ACTIVE | COMMUNITY
Start: 1900-01-01

## 2022-07-14 RX ORDER — AMLODIPINE BESYLATE 5 MG/1
TAKE 1 TABLET (5 MG) BY ORAL ROUTE ONCE DAILY TABLET ORAL 1
Qty: 0 | Refills: 0 | Status: ACTIVE | COMMUNITY
Start: 1900-01-01

## 2022-07-14 RX ORDER — FLUTICASONE PROPIONATE 50 UG/1
SPRAY 2 SPRAYS (100 MCG) IN EACH NOSTRIL BY INTRANASAL ROUTE ONCE DAILY AS NEEDED SPRAY, METERED NASAL 1
Qty: 1 | Refills: 0 | Status: ACTIVE | COMMUNITY
Start: 1900-01-01

## 2022-07-14 RX ORDER — MULTIVITAMIN WITH IRON
TAKE 1 TABLET BY ORAL ROUTE DAILY TABLET ORAL 1
Qty: 0 | Refills: 0 | Status: ON HOLD | COMMUNITY
Start: 1900-01-01

## 2022-07-14 RX ORDER — ESOMEPRAZOLE MAGNESIUM 20 MG/1
1 CAPSULE CAPSULE, DELAYED RELEASE ORAL ONCE A DAY
Qty: 90 CAPSULE | Refills: 3 | Status: ACTIVE | COMMUNITY
Start: 2022-05-11

## 2022-07-14 RX ORDER — ATORVASTATIN CALCIUM 20 MG/1
TAKE 1 TABLET (20 MG) BY ORAL ROUTE ONCE DAILY TABLET, FILM COATED ORAL 1
Qty: 0 | Refills: 0 | Status: ACTIVE | COMMUNITY
Start: 1900-01-01

## 2022-07-14 RX ORDER — FAMOTIDINE 40 MG/1
TAKE 1 TABLET BY MOUTH TWICE A DAY TABLET ORAL TWICE A DAY
Qty: 180 TABLET | Refills: 3 | Status: ACTIVE | COMMUNITY

## 2022-07-14 RX ORDER — DEXTROSE 4 G
TAKE 1 TABLET (10 MG) BY ORAL ROUTE ONCE DAILY TABLET,CHEWABLE ORAL 1
Qty: 0 | Refills: 0 | Status: ON HOLD | COMMUNITY
Start: 1900-01-01

## 2022-07-14 RX ORDER — IBUPROFEN 400 MG/1
TABLET ORAL
Qty: 0 | Refills: 0 | Status: ON HOLD | COMMUNITY
Start: 1900-01-01

## 2022-07-14 NOTE — HPI-TODAY'S VISIT:
3/5/2020  Patrick presents for colonoscopy follow up. Her Colonoscopy reveals a benign polyp and normal random biopsies. She continues to struggle with 3-4 loose Bms in the am. She thinks it is related to the prilosec 40mg daily. She would like to wean off but is on NSAIDs PRN for recent joint replacement. She is not taking anything for the diarrhea. She has no symptoms of reflux on omeprazole. Today she is struggling with her symptoms. MB   6/29/2020 Patrick presents for follow up of reflux and IBS-D. Since her last visit she stopped PPI and is on famotidine 40mg BID with control of her symptoms. She has an occasional breakthrough when she over eats. This and the florastor have helped her diarrhea but she still has intermittent episodes of loose stool and urgency. Today she is doing better but still with some diarrhea. MB  8/10/2020 Patrick presents for follow up of reflux and IBS-D. She is doing well on famotidine 40mg daily. She is s/p xifaxan with improvement initially but recurrent symptoms. She is on florastor everyother day due to cost. Today she is doing well otherwise but still struggling with her diarrhea. MB  11/2/2020 Mrs. Glover presents for follow-up of reflux, IBS-D, and fatty liver.  Since her last visit she has been doing well.  We did repeat the Xifaxan which helped her diarrhea.  She still has occasional flares every 1 to 3 weeks, she has not used the Imodium yet.  The IBgard seem to increase her reflux, she is doing well on famotidine 40 mg twice daily.  Today she is doing well otherwise with no new GI complaints, but she is working on weight loss for her fatty liver, she has repeat lab work scheduled this winter with Dr. Minor.  MB   5/3/2021 Rox presents for follow-up of reflux and irritable bowel syndrome.  Since her last visit she has been doing about the same.  She is on Pepcid 40 mg twice daily.  She only has occasional heartburn breakthrough when she overeats.  Her bowels have been fairly normal but she still has days of urgency and diarrhea with 1-3 bowel movements in the morning.  She does think the Florastor is helpful.  Xifaxan improves her symptoms if she is flaring.  She agrees her diet likely plays a role and would like to look into the FODMAP diet.  Today she is doing well with no new GI complaints.  MB  5/11/2022 Rox presents for follow-up of reflux, diarrhea, and fatty liver.  Since her last visit she has had a flare of epigastric abdominal pain after meals with nausea bloating and reflux.  She is on famotidine 40 mg twice daily.  She does still have her gallbladder.  She is a diabetic.  She was started on Ozempic 2 months ago but her symptoms were increased before that.  She is never had an upper endoscopy by Dr. Vincent, she may have had one in the distant past by Dr. Cast.  She is never had a gastric emptying study.  Today she is her main complaint is postprandial bloating nausea and epigastric abdominal pain.  She does agree to start low-dose PPI, these have bothered her diarrhea in the past.  Her diarrhea is stable and Florastor daily.  Her last colonoscopy was in 2019 by Dr. Vincent with normal random biopsies and otherwise normal.  MB  6/15/2022 Rox presents for follow up of reflux, diarrhea, and fatty liver with cirrhosis. Since her last visit her US shows cirrhosis. Her contrasted CT confirms this. Her EGD reveals reflux and gastritis. Her GES is positive. She has not had her serologies yet. She has no sequela of the disease so far. She is meeting with nutrition for her gastroparesis this week. Her reflux is stable on pepcid 40mg and nexium in the am. Her stools are loose but at her baseline. Today she is doing well and wants to know if she should continue ozempic as it is likely contributing to her nausea and diarrhea. She wants to give this more time as it has helped her A1C significantly and try to mange her GP with nutrition. MB  This telehealth visit was provided at the patient's home. 7/14/2022 Rox presents for follow-up of reflux, diabetic gastroparesis, and psoriasis.  Since her last visit she has been doing better on Ozempic.  She is cut back on very small meals and is down 22 pounds overall.  Her chronic serologies were negative.  Her meld is 7.  Today we had a long discussion regarding advanced liver disease.  I want to encourage her to continue to work on weight loss, tight blood sugar control, and cholesterol management.  Her liver enzymes are mildly elevated.  She has met with a nutritionist and her gastroparesis overall is doing somewhat better.  She continues to decline any further medications for this.  Her reflux is stable on Nexium in the morning and famotidine at night.  Today overall she is doing fairly well, she has several questions regarding liver disease which we reviewed.  She agrees to see me every 6 months with ultrasound imaging and lab work.  I have encouraged her to continue management of her cholesterol and blood sugar closely with Dr. Minor.  Otherwise she is doing well today.  MB

## 2022-07-15 LAB
A/G RATIO: 1.9
ALBUMIN: 5
ALKALINE PHOSPHATASE: 90
ALT (SGPT): 41
AST (SGOT): 45
BASO (ABSOLUTE): 0.1
BASOS: 1
BILIRUBIN, TOTAL: 0.9
BUN/CREATININE RATIO: 34
BUN: 23
CALCIUM: 10.1
CARBON DIOXIDE, TOTAL: 23
CHLORIDE: 95
CREATININE: 0.67
EGFR: 98
EOS (ABSOLUTE): 0.2
EOS: 3
GLOBULIN, TOTAL: 2.6
GLUCOSE: 113
HEMATOCRIT: 42
HEMATOLOGY COMMENTS:: (no result)
HEMOGLOBIN: 14
IMMATURE CELLS: (no result)
IMMATURE GRANS (ABS): 0
IMMATURE GRANULOCYTES: 0
INR: 1
LYMPHS (ABSOLUTE): 2
LYMPHS: 30
MCH: 30.3
MCHC: 33.3
MCV: 91
MONOCYTES(ABSOLUTE): 0.5
MONOCYTES: 8
NEUTROPHILS (ABSOLUTE): 3.9
NEUTROPHILS: 58
NRBC: (no result)
PLATELETS: 164
POTASSIUM: 4
PROTEIN, TOTAL: 7.6
PROTHROMBIN TIME: 10.8
RBC: 4.62
RDW: 14.1
SODIUM: 138
WBC: 6.7

## 2022-07-18 ENCOUNTER — TELEPHONE ENCOUNTER (OUTPATIENT)
Dept: URBAN - METROPOLITAN AREA CLINIC 92 | Facility: CLINIC | Age: 62
End: 2022-07-18

## 2022-07-20 ENCOUNTER — WEB ENCOUNTER (OUTPATIENT)
Dept: URBAN - NONMETROPOLITAN AREA CLINIC 2 | Facility: CLINIC | Age: 62
End: 2022-07-20

## 2022-07-22 ENCOUNTER — WEB ENCOUNTER (OUTPATIENT)
Dept: URBAN - NONMETROPOLITAN AREA CLINIC 2 | Facility: CLINIC | Age: 62
End: 2022-07-22

## 2022-07-26 ENCOUNTER — WEB ENCOUNTER (OUTPATIENT)
Dept: URBAN - NONMETROPOLITAN AREA CLINIC 2 | Facility: CLINIC | Age: 62
End: 2022-07-26

## 2022-07-27 ENCOUNTER — OFFICE VISIT (OUTPATIENT)
Dept: URBAN - METROPOLITAN AREA TELEHEALTH 2 | Facility: TELEHEALTH | Age: 62
End: 2022-07-27
Payer: MEDICARE

## 2022-07-27 VITALS — BODY MASS INDEX: 38.76 KG/M2 | HEIGHT: 64 IN | WEIGHT: 227 LBS

## 2022-07-27 DIAGNOSIS — K74.60 CIRRHOSIS: ICD-10-CM

## 2022-07-27 PROCEDURE — 97803 MED NUTRITION INDIV SUBSEQ: CPT | Performed by: DIETITIAN, REGISTERED

## 2022-07-27 RX ORDER — FLUTICASONE PROPIONATE 50 UG/1
SPRAY 2 SPRAYS (100 MCG) IN EACH NOSTRIL BY INTRANASAL ROUTE ONCE DAILY AS NEEDED SPRAY, METERED NASAL 1
Qty: 1 | Refills: 0 | Status: ACTIVE | COMMUNITY
Start: 1900-01-01

## 2022-07-27 RX ORDER — MULTIVITAMIN WITH IRON
TAKE 1 TABLET BY ORAL ROUTE DAILY TABLET ORAL 1
Qty: 0 | Refills: 0 | Status: ON HOLD | COMMUNITY
Start: 1900-01-01

## 2022-07-27 RX ORDER — FAMOTIDINE 40 MG/1
TAKE 1 TABLET BY MOUTH TWICE A DAY TABLET ORAL TWICE A DAY
Qty: 180 TABLET | Refills: 3 | Status: ACTIVE | COMMUNITY

## 2022-07-27 RX ORDER — ACETAMINOPHEN 500 MG/1
TABLET, FILM COATED ORAL
Qty: 0 | Refills: 0 | Status: ON HOLD | COMMUNITY
Start: 1900-01-01

## 2022-07-27 RX ORDER — AMLODIPINE BESYLATE 5 MG/1
TAKE 1 TABLET (5 MG) BY ORAL ROUTE ONCE DAILY TABLET ORAL 1
Qty: 0 | Refills: 0 | Status: ACTIVE | COMMUNITY
Start: 1900-01-01

## 2022-07-27 RX ORDER — METFORMIN HYDROCHLORIDE 1000 MG/1
TAKE 1 TABLET (1,000 MG) BY ORAL ROUTE 2 TIMES PER DAY WITH MORNING AND EVENING MEALS TABLET, COATED ORAL 2
Qty: 0 | Refills: 0 | Status: ACTIVE | COMMUNITY
Start: 1900-01-01

## 2022-07-27 RX ORDER — DEXTROSE 4 G
TAKE 1 TABLET (10 MG) BY ORAL ROUTE ONCE DAILY TABLET,CHEWABLE ORAL 1
Qty: 0 | Refills: 0 | Status: ON HOLD | COMMUNITY
Start: 1900-01-01

## 2022-07-27 RX ORDER — IRBESARTAN AND HYDROCHLOROTHIAZIDE 150; 12.5 MG/1; MG/1
TAKE 2 TABLETS BY ORAL ROUTE ONCE DAILY TABLET ORAL 1
Qty: 0 | Refills: 0 | Status: ACTIVE | COMMUNITY
Start: 1900-01-01

## 2022-07-27 RX ORDER — CLINDAMYCIN HYDROCHLORIDE 300 MG/1
TAKE 2 CAPSULES BY MOUTH BEFORE PROCEDURE CAPSULE ORAL
Qty: 0 | Refills: 0 | Status: ON HOLD | COMMUNITY
Start: 1900-01-01

## 2022-07-27 RX ORDER — ASPIRIN 81 MG/1
TAKE 1 TABLET (81 MG) BY ORAL ROUTE ONCE DAILY TABLET, COATED ORAL 1
Qty: 0 | Refills: 0 | Status: ACTIVE | COMMUNITY
Start: 1900-01-01

## 2022-07-27 RX ORDER — ATORVASTATIN CALCIUM 20 MG/1
TAKE 1 TABLET (20 MG) BY ORAL ROUTE ONCE DAILY TABLET, FILM COATED ORAL 1
Qty: 0 | Refills: 0 | Status: ACTIVE | COMMUNITY
Start: 1900-01-01

## 2022-07-27 RX ORDER — IBUPROFEN 400 MG/1
TABLET ORAL
Qty: 0 | Refills: 0 | Status: ON HOLD | COMMUNITY
Start: 1900-01-01

## 2022-07-27 RX ORDER — OMEPRAZOLE 40 MG/1
TAKE 1 CAPSULE BY ORAL ROUTE EVERY OTHER DAY CAPSULE, DELAYED RELEASE PELLETS ORAL
Qty: 0 | Refills: 0 | Status: ON HOLD | COMMUNITY
Start: 1900-01-01

## 2022-07-27 RX ORDER — ESOMEPRAZOLE MAGNESIUM 20 MG/1
1 CAPSULE CAPSULE, DELAYED RELEASE ORAL ONCE A DAY
Qty: 90 CAPSULE | Refills: 3 | Status: ACTIVE | COMMUNITY
Start: 2022-05-11

## 2022-08-29 ENCOUNTER — OFFICE VISIT (OUTPATIENT)
Dept: URBAN - METROPOLITAN AREA TELEHEALTH 2 | Facility: TELEHEALTH | Age: 62
End: 2022-08-29
Payer: MEDICARE

## 2022-08-29 VITALS — HEIGHT: 64 IN | BODY MASS INDEX: 37.56 KG/M2 | WEIGHT: 220 LBS

## 2022-08-29 DIAGNOSIS — K74.60 HEPATIC CIRRHOSIS, UNSPECIFIED HEPATIC CIRRHOSIS TYPE, UNSPECIFIED WHETHER ASCITES PRESENT: ICD-10-CM

## 2022-08-29 DIAGNOSIS — E66.01 MORBID OBESITY: ICD-10-CM

## 2022-08-29 PROCEDURE — 97803 MED NUTRITION INDIV SUBSEQ: CPT | Performed by: DIETITIAN, REGISTERED

## 2022-08-29 RX ORDER — IBUPROFEN 400 MG/1
TABLET ORAL
Qty: 0 | Refills: 0 | Status: ON HOLD | COMMUNITY
Start: 1900-01-01

## 2022-08-29 RX ORDER — ESOMEPRAZOLE MAGNESIUM 20 MG/1
1 CAPSULE CAPSULE, DELAYED RELEASE ORAL ONCE A DAY
Qty: 90 CAPSULE | Refills: 3 | Status: ACTIVE | COMMUNITY
Start: 2022-05-11

## 2022-08-29 RX ORDER — FAMOTIDINE 40 MG/1
TAKE 1 TABLET BY MOUTH TWICE A DAY TABLET ORAL TWICE A DAY
Qty: 180 TABLET | Refills: 3 | Status: ACTIVE | COMMUNITY

## 2022-08-29 RX ORDER — FLUTICASONE PROPIONATE 50 UG/1
SPRAY 2 SPRAYS (100 MCG) IN EACH NOSTRIL BY INTRANASAL ROUTE ONCE DAILY AS NEEDED SPRAY, METERED NASAL 1
Qty: 1 | Refills: 0 | Status: ACTIVE | COMMUNITY
Start: 1900-01-01

## 2022-08-29 RX ORDER — CLINDAMYCIN HYDROCHLORIDE 300 MG/1
TAKE 2 CAPSULES BY MOUTH BEFORE PROCEDURE CAPSULE ORAL
Qty: 0 | Refills: 0 | Status: ON HOLD | COMMUNITY
Start: 1900-01-01

## 2022-08-29 RX ORDER — ACETAMINOPHEN 500 MG/1
TABLET, FILM COATED ORAL
Qty: 0 | Refills: 0 | Status: ON HOLD | COMMUNITY
Start: 1900-01-01

## 2022-08-29 RX ORDER — DEXTROSE 4 G
TAKE 1 TABLET (10 MG) BY ORAL ROUTE ONCE DAILY TABLET,CHEWABLE ORAL 1
Qty: 0 | Refills: 0 | Status: ON HOLD | COMMUNITY
Start: 1900-01-01

## 2022-08-29 RX ORDER — MULTIVITAMIN WITH IRON
TAKE 1 TABLET BY ORAL ROUTE DAILY TABLET ORAL 1
Qty: 0 | Refills: 0 | Status: ON HOLD | COMMUNITY
Start: 1900-01-01

## 2022-08-29 RX ORDER — ATORVASTATIN CALCIUM 20 MG/1
TAKE 1 TABLET (20 MG) BY ORAL ROUTE ONCE DAILY TABLET, FILM COATED ORAL 1
Qty: 0 | Refills: 0 | Status: ACTIVE | COMMUNITY
Start: 1900-01-01

## 2022-08-29 RX ORDER — METFORMIN HYDROCHLORIDE 1000 MG/1
TAKE 1 TABLET (1,000 MG) BY ORAL ROUTE 2 TIMES PER DAY WITH MORNING AND EVENING MEALS TABLET, COATED ORAL 2
Qty: 0 | Refills: 0 | Status: ACTIVE | COMMUNITY
Start: 1900-01-01

## 2022-08-29 RX ORDER — ASPIRIN 81 MG/1
TAKE 1 TABLET (81 MG) BY ORAL ROUTE ONCE DAILY TABLET, COATED ORAL 1
Qty: 0 | Refills: 0 | Status: ACTIVE | COMMUNITY
Start: 1900-01-01

## 2022-08-29 RX ORDER — AMLODIPINE BESYLATE 5 MG/1
TAKE 1 TABLET (5 MG) BY ORAL ROUTE ONCE DAILY TABLET ORAL 1
Qty: 0 | Refills: 0 | Status: ACTIVE | COMMUNITY
Start: 1900-01-01

## 2022-08-29 RX ORDER — OMEPRAZOLE 40 MG/1
TAKE 1 CAPSULE BY ORAL ROUTE EVERY OTHER DAY CAPSULE, DELAYED RELEASE PELLETS ORAL
Qty: 0 | Refills: 0 | Status: ON HOLD | COMMUNITY
Start: 1900-01-01

## 2022-08-29 RX ORDER — IRBESARTAN AND HYDROCHLOROTHIAZIDE 150; 12.5 MG/1; MG/1
TAKE 2 TABLETS BY ORAL ROUTE ONCE DAILY TABLET ORAL 1
Qty: 0 | Refills: 0 | Status: ACTIVE | COMMUNITY
Start: 1900-01-01

## 2022-10-05 ENCOUNTER — WEB ENCOUNTER (OUTPATIENT)
Dept: URBAN - NONMETROPOLITAN AREA CLINIC 2 | Facility: CLINIC | Age: 62
End: 2022-10-05

## 2022-10-11 ENCOUNTER — WEB ENCOUNTER (OUTPATIENT)
Dept: URBAN - NONMETROPOLITAN AREA CLINIC 2 | Facility: CLINIC | Age: 62
End: 2022-10-11

## 2022-11-21 ENCOUNTER — WEB ENCOUNTER (OUTPATIENT)
Dept: URBAN - NONMETROPOLITAN AREA CLINIC 2 | Facility: CLINIC | Age: 62
End: 2022-11-21

## 2022-12-13 ENCOUNTER — TELEPHONE ENCOUNTER (OUTPATIENT)
Dept: URBAN - METROPOLITAN AREA CLINIC 92 | Facility: CLINIC | Age: 62
End: 2022-12-13

## 2022-12-13 ENCOUNTER — WEB ENCOUNTER (OUTPATIENT)
Dept: URBAN - NONMETROPOLITAN AREA CLINIC 2 | Facility: CLINIC | Age: 62
End: 2022-12-13

## 2022-12-20 LAB
A/G RATIO: 1.7
ALBUMIN: 4.5
ALKALINE PHOSPHATASE: 102
ALT (SGPT): 26
AST (SGOT): 30
BASO (ABSOLUTE): 0
BASOS: 0
BILIRUBIN, TOTAL: 0.6
BUN/CREATININE RATIO: 28
BUN: 16
CALCIUM: 9.8
CARBON DIOXIDE, TOTAL: 25
CHLORIDE: 103
CREATININE: 0.58
EGFR: 101
EOS (ABSOLUTE): 0.5
EOS: 7
GLOBULIN, TOTAL: 2.6
GLUCOSE: 140
HEMATOCRIT: 39.5
HEMATOLOGY COMMENTS:: (no result)
HEMOGLOBIN: 12.6
IMMATURE CELLS: (no result)
IMMATURE GRANS (ABS): 0
IMMATURE GRANULOCYTES: 1
INR: 1.1
LYMPHS (ABSOLUTE): 1.5
LYMPHS: 22
MCH: 29
MCHC: 31.9
MCV: 91
MONOCYTES(ABSOLUTE): 0.3
MONOCYTES: 5
NEUTROPHILS (ABSOLUTE): 4.2
NEUTROPHILS: 65
NRBC: (no result)
PLATELETS: 144
POTASSIUM: 4.2
PROTEIN, TOTAL: 7.1
PROTHROMBIN TIME: 11
RBC: 4.34
RDW: 13.1
SODIUM: 143
WBC: 6.5

## 2023-01-12 ENCOUNTER — OFFICE VISIT (OUTPATIENT)
Dept: URBAN - NONMETROPOLITAN AREA CLINIC 2 | Facility: CLINIC | Age: 63
End: 2023-01-12
Payer: MEDICARE

## 2023-01-12 VITALS
HEART RATE: 67 BPM | SYSTOLIC BLOOD PRESSURE: 145 MMHG | HEIGHT: 64 IN | BODY MASS INDEX: 35 KG/M2 | WEIGHT: 205 LBS | DIASTOLIC BLOOD PRESSURE: 81 MMHG

## 2023-01-12 DIAGNOSIS — K76.0 FATTY LIVER: ICD-10-CM

## 2023-01-12 DIAGNOSIS — K31.84 GASTROPARESIS: ICD-10-CM

## 2023-01-12 DIAGNOSIS — Z12.11 ROUTINE COLON: ICD-10-CM

## 2023-01-12 DIAGNOSIS — R10.13 EPIGASTRIC ABDOMINAL PAIN: ICD-10-CM

## 2023-01-12 DIAGNOSIS — K74.60 HEPATIC CIRRHOSIS, UNSPECIFIED HEPATIC CIRRHOSIS TYPE, UNSPECIFIED WHETHER ASCITES PRESENT: ICD-10-CM

## 2023-01-12 DIAGNOSIS — R19.7 DIARRHEA: ICD-10-CM

## 2023-01-12 DIAGNOSIS — E66.01 MORBID OBESITY: ICD-10-CM

## 2023-01-12 DIAGNOSIS — K21.9 GERD (GASTROESOPHAGEAL REFLUX DISEASE): ICD-10-CM

## 2023-01-12 DIAGNOSIS — R11.0 NAUSEA: ICD-10-CM

## 2023-01-12 PROBLEM — 422587007: Status: ACTIVE | Noted: 2022-05-11

## 2023-01-12 PROBLEM — 79922009: Status: ACTIVE | Noted: 2022-05-11

## 2023-01-12 PROBLEM — 238136002 MORBID OBESITY: Status: ACTIVE | Noted: 2022-06-21

## 2023-01-12 PROBLEM — 197321007 FATTY LIVER: Status: ACTIVE | Noted: 2020-11-02

## 2023-01-12 PROCEDURE — 99214 OFFICE O/P EST MOD 30 MIN: CPT | Performed by: NURSE PRACTITIONER

## 2023-01-12 RX ORDER — FLUTICASONE PROPIONATE 50 UG/1
SPRAY 2 SPRAYS (100 MCG) IN EACH NOSTRIL BY INTRANASAL ROUTE ONCE DAILY AS NEEDED SPRAY, METERED NASAL 1
Qty: 1 | Refills: 0 | Status: ACTIVE | COMMUNITY
Start: 1900-01-01

## 2023-01-12 RX ORDER — ASPIRIN 81 MG/1
TAKE 1 TABLET (81 MG) BY ORAL ROUTE ONCE DAILY TABLET, COATED ORAL 1
Qty: 0 | Refills: 0 | Status: ACTIVE | COMMUNITY
Start: 1900-01-01

## 2023-01-12 RX ORDER — OMEPRAZOLE 40 MG/1
TAKE 1 CAPSULE BY ORAL ROUTE EVERY OTHER DAY CAPSULE, DELAYED RELEASE PELLETS ORAL
Qty: 0 | Refills: 0 | Status: ON HOLD | COMMUNITY
Start: 1900-01-01

## 2023-01-12 RX ORDER — DEXTROSE 4 G
TAKE 1 TABLET (10 MG) BY ORAL ROUTE ONCE DAILY TABLET,CHEWABLE ORAL 1
Qty: 0 | Refills: 0 | Status: ON HOLD | COMMUNITY
Start: 1900-01-01

## 2023-01-12 RX ORDER — ESOMEPRAZOLE MAGNESIUM 20 MG/1
1 CAPSULE CAPSULE, DELAYED RELEASE ORAL ONCE A DAY
Qty: 90 CAPSULE | Refills: 3 | Status: ACTIVE | COMMUNITY
Start: 2022-05-11

## 2023-01-12 RX ORDER — ATORVASTATIN CALCIUM 20 MG/1
TAKE 1 TABLET (20 MG) BY ORAL ROUTE ONCE DAILY TABLET, FILM COATED ORAL 1
Qty: 0 | Refills: 0 | Status: ACTIVE | COMMUNITY
Start: 1900-01-01

## 2023-01-12 RX ORDER — METFORMIN HYDROCHLORIDE 1000 MG/1
TAKE 1 TABLET (1,000 MG) BY ORAL ROUTE 2 TIMES PER DAY WITH MORNING AND EVENING MEALS TABLET, COATED ORAL 2
Qty: 0 | Refills: 0 | Status: ACTIVE | COMMUNITY
Start: 1900-01-01

## 2023-01-12 RX ORDER — MULTIVITAMIN WITH IRON
TAKE 1 TABLET BY ORAL ROUTE DAILY TABLET ORAL 1
Qty: 0 | Refills: 0 | Status: ON HOLD | COMMUNITY
Start: 1900-01-01

## 2023-01-12 RX ORDER — AMLODIPINE BESYLATE 5 MG/1
TAKE 1 TABLET (5 MG) BY ORAL ROUTE ONCE DAILY TABLET ORAL 1
Qty: 0 | Refills: 0 | Status: DISCONTINUED | COMMUNITY
Start: 1900-01-01

## 2023-01-12 RX ORDER — FAMOTIDINE 40 MG/1
TAKE 1 TABLET BY MOUTH TWICE A DAY TABLET ORAL TWICE A DAY
Qty: 180 TABLET | Refills: 3 | Status: ACTIVE | COMMUNITY

## 2023-01-12 RX ORDER — IRBESARTAN AND HYDROCHLOROTHIAZIDE 150; 12.5 MG/1; MG/1
TAKE 2 TABLETS BY ORAL ROUTE ONCE DAILY TABLET ORAL 1
Qty: 0 | Refills: 0 | Status: ACTIVE | COMMUNITY
Start: 1900-01-01

## 2023-01-12 RX ORDER — IBUPROFEN 400 MG/1
TABLET ORAL
Qty: 0 | Refills: 0 | Status: ON HOLD | COMMUNITY
Start: 1900-01-01

## 2023-01-12 RX ORDER — ACETAMINOPHEN 500 MG/1
TABLET, FILM COATED ORAL
Qty: 0 | Refills: 0 | Status: ON HOLD | COMMUNITY
Start: 1900-01-01

## 2023-01-12 RX ORDER — CLINDAMYCIN HYDROCHLORIDE 300 MG/1
TAKE 2 CAPSULES BY MOUTH BEFORE PROCEDURE CAPSULE ORAL
Qty: 0 | Refills: 0 | Status: ON HOLD | COMMUNITY
Start: 1900-01-01

## 2023-01-12 NOTE — HPI-TODAY'S VISIT:
3/5/2020  Patrick presents for colonoscopy follow up. Her Colonoscopy reveals a benign polyp and normal random biopsies. She continues to struggle with 3-4 loose Bms in the am. She thinks it is related to the prilosec 40mg daily. She would like to wean off but is on NSAIDs PRN for recent joint replacement. She is not taking anything for the diarrhea. She has no symptoms of reflux on omeprazole. Today she is struggling with her symptoms. MB   6/29/2020 Patrick presents for follow up of reflux and IBS-D. Since her last visit she stopped PPI and is on famotidine 40mg BID with control of her symptoms. She has an occasional breakthrough when she over eats. This and the florastor have helped her diarrhea but she still has intermittent episodes of loose stool and urgency. Today she is doing better but still with some diarrhea. MB  8/10/2020 Patrick presents for follow up of reflux and IBS-D. She is doing well on famotidine 40mg daily. She is s/p xifaxan with improvement initially but recurrent symptoms. She is on florastor everyother day due to cost. Today she is doing well otherwise but still struggling with her diarrhea. MB  11/2/2020 Mrs. Glover presents for follow-up of reflux, IBS-D, and fatty liver.  Since her last visit she has been doing well.  We did repeat the Xifaxan which helped her diarrhea.  She still has occasional flares every 1 to 3 weeks, she has not used the Imodium yet.  The IBgard seem to increase her reflux, she is doing well on famotidine 40 mg twice daily.  Today she is doing well otherwise with no new GI complaints, but she is working on weight loss for her fatty liver, she has repeat lab work scheduled this winter with Dr. Minor.  MB   5/3/2021 Rox presents for follow-up of reflux and irritable bowel syndrome.  Since her last visit she has been doing about the same.  She is on Pepcid 40 mg twice daily.  She only has occasional heartburn breakthrough when she overeats.  Her bowels have been fairly normal but she still has days of urgency and diarrhea with 1-3 bowel movements in the morning.  She does think the Florastor is helpful.  Xifaxan improves her symptoms if she is flaring.  She agrees her diet likely plays a role and would like to look into the FODMAP diet.  Today she is doing well with no new GI complaints.  MB  5/11/2022 Rox presents for follow-up of reflux, diarrhea, and fatty liver.  Since her last visit she has had a flare of epigastric abdominal pain after meals with nausea bloating and reflux.  She is on famotidine 40 mg twice daily.  She does still have her gallbladder.  She is a diabetic.  She was started on Ozempic 2 months ago but her symptoms were increased before that.  She is never had an upper endoscopy by Dr. Vincent, she may have had one in the distant past by Dr. Cast.  She is never had a gastric emptying study.  Today she is her main complaint is postprandial bloating nausea and epigastric abdominal pain.  She does agree to start low-dose PPI, these have bothered her diarrhea in the past.  Her diarrhea is stable and Florastor daily.  Her last colonoscopy was in 2019 by Dr. Vincent with normal random biopsies and otherwise normal.  MB  6/15/2022 Rox presents for follow up of reflux, diarrhea, and fatty liver with cirrhosis. Since her last visit her US shows cirrhosis. Her contrasted CT confirms this. Her EGD reveals reflux and gastritis. Her GES is positive. She has not had her serologies yet. She has no sequela of the disease so far. She is meeting with nutrition for her gastroparesis this week. Her reflux is stable on pepcid 40mg and nexium in the am. Her stools are loose but at her baseline. Today she is doing well and wants to know if she should continue ozempic as it is likely contributing to her nausea and diarrhea. She wants to give this more time as it has helped her A1C significantly and try to mange her GP with nutrition. MB  This telehealth visit was provided at the patient's home. 7/14/2022 Rox presents for follow-up of reflux, diabetic gastroparesis, and psoriasis.  Since her last visit she has been doing better on Ozempic.  She is cut back on very small meals and is down 22 pounds overall.  Her chronic serologies were negative.  Her meld is 7.  Today we had a long discussion regarding advanced liver disease.  I want to encourage her to continue to work on weight loss, tight blood sugar control, and cholesterol management.  Her liver enzymes are mildly elevated.  She has met with a nutritionist and her gastroparesis overall is doing somewhat better.  She continues to decline any further medications for this.  Her reflux is stable on Nexium in the morning and famotidine at night.  Today overall she is doing fairly well, she has several questions regarding liver disease which we reviewed.  She agrees to see me every 6 months with ultrasound imaging and lab work.  I have encouraged her to continue management of her cholesterol and blood sugar closely with Dr. Minor.  Otherwise she is doing well today.  MB 1/12/2023 Rox presents for follow-up of reflux, gastroparesis, and fatty liver cirrhosis.  Her repeat liver enzymes have normalized, she is lost 50 pounds total in the past 2 years and she remains on Ozempic.  Her reflux is stable on PPI and Pepcid.  Her gastroparesis symptoms have improved, she remains on Ozempic and is eating small frequent meals.  Her bowels are moving regularly.  She has no sequela of her liver disease including no signs of encephalopathy ascites or portal hypertension.  Her EGD this year shows no varices.  Her HCC screening in December shows no new lesions.  Today she is doing well with no new GI complaints.  MB

## 2023-01-14 ENCOUNTER — LAB OUTSIDE AN ENCOUNTER (OUTPATIENT)
Dept: URBAN - NONMETROPOLITAN AREA CLINIC 2 | Facility: CLINIC | Age: 63
End: 2023-01-14

## 2023-04-27 ENCOUNTER — WEB ENCOUNTER (OUTPATIENT)
Dept: URBAN - NONMETROPOLITAN AREA CLINIC 2 | Facility: CLINIC | Age: 63
End: 2023-04-27

## 2023-04-27 ENCOUNTER — ERX REFILL RESPONSE (OUTPATIENT)
Dept: URBAN - NONMETROPOLITAN AREA CLINIC 2 | Facility: CLINIC | Age: 63
End: 2023-04-27

## 2023-04-27 RX ORDER — ESOMEPRAZOLE MAGNESIUM 20 MG/1
1 CAPSULE CAPSULE, DELAYED RELEASE ORAL ONCE A DAY
Qty: 90 CAPSULE | Refills: 3

## 2023-04-27 RX ORDER — ESOMEPRAZOLE MAGNESIUM 20 MG/1
1 CAPSULE CAPSULE, DELAYED RELEASE ORAL ONCE A DAY
Qty: 90 CAPSULE | Refills: 3 | OUTPATIENT

## 2023-06-01 ENCOUNTER — LAB OUTSIDE AN ENCOUNTER (OUTPATIENT)
Dept: URBAN - NONMETROPOLITAN AREA CLINIC 2 | Facility: CLINIC | Age: 63
End: 2023-06-01

## 2023-06-02 LAB
A/G RATIO: 1.7
ALBUMIN: 4.5
ALKALINE PHOSPHATASE: 77
ALT (SGPT): 25
AST (SGOT): 30
BASO (ABSOLUTE): 0.1
BASOS: 1
BILIRUBIN, TOTAL: 0.8
BUN/CREATININE RATIO: 22
BUN: 15
CALCIUM: 9.8
CARBON DIOXIDE, TOTAL: 27
CHLORIDE: 103
CREATININE: 0.68
EGFR: 97
EOS (ABSOLUTE): 0.2
EOS: 3
GLOBULIN, TOTAL: 2.6
GLUCOSE: 87
HEMATOCRIT: 40.2
HEMATOLOGY COMMENTS:: (no result)
HEMOGLOBIN: 13.4
IMMATURE CELLS: (no result)
IMMATURE GRANS (ABS): 0
IMMATURE GRANULOCYTES: 0
INR: 1.1
LYMPHS (ABSOLUTE): 2.3
LYMPHS: 33
MCH: 30
MCHC: 33.3
MCV: 90
MONOCYTES(ABSOLUTE): 0.5
MONOCYTES: 7
NEUTROPHILS (ABSOLUTE): 4
NEUTROPHILS: 56
NRBC: (no result)
PLATELETS: 148
POTASSIUM: 4.2
PROTEIN, TOTAL: 7.1
PROTHROMBIN TIME: 11
RBC: 4.46
RDW: 13.6
SODIUM: 143
WBC: 7.1

## 2023-07-12 ENCOUNTER — LAB OUTSIDE AN ENCOUNTER (OUTPATIENT)
Dept: URBAN - NONMETROPOLITAN AREA CLINIC 2 | Facility: CLINIC | Age: 63
End: 2023-07-12

## 2023-07-13 ENCOUNTER — OFFICE VISIT (OUTPATIENT)
Dept: URBAN - NONMETROPOLITAN AREA CLINIC 2 | Facility: CLINIC | Age: 63
End: 2023-07-13
Payer: MEDICARE

## 2023-07-13 VITALS
WEIGHT: 207 LBS | HEART RATE: 75 BPM | SYSTOLIC BLOOD PRESSURE: 158 MMHG | TEMPERATURE: 98.7 F | DIASTOLIC BLOOD PRESSURE: 85 MMHG | BODY MASS INDEX: 35.34 KG/M2 | HEIGHT: 64 IN

## 2023-07-13 DIAGNOSIS — R11.0 NAUSEA: ICD-10-CM

## 2023-07-13 DIAGNOSIS — R19.7 DIARRHEA: ICD-10-CM

## 2023-07-13 DIAGNOSIS — Z12.11 ROUTINE COLON: ICD-10-CM

## 2023-07-13 DIAGNOSIS — K31.84 GASTROPARESIS: ICD-10-CM

## 2023-07-13 DIAGNOSIS — R10.13 EPIGASTRIC ABDOMINAL PAIN: ICD-10-CM

## 2023-07-13 DIAGNOSIS — K76.0 FATTY LIVER: ICD-10-CM

## 2023-07-13 DIAGNOSIS — E66.01 MORBID OBESITY: ICD-10-CM

## 2023-07-13 DIAGNOSIS — K21.9 GERD (GASTROESOPHAGEAL REFLUX DISEASE): ICD-10-CM

## 2023-07-13 DIAGNOSIS — K74.60 HEPATIC CIRRHOSIS, UNSPECIFIED HEPATIC CIRRHOSIS TYPE, UNSPECIFIED WHETHER ASCITES PRESENT: ICD-10-CM

## 2023-07-13 PROBLEM — 235675006 GASTROPARESIS: Status: ACTIVE | Noted: 2022-06-21

## 2023-07-13 PROCEDURE — 99214 OFFICE O/P EST MOD 30 MIN: CPT | Performed by: NURSE PRACTITIONER

## 2023-07-13 RX ORDER — ACETAMINOPHEN 500 MG/1
TABLET, FILM COATED ORAL
Qty: 0 | Refills: 0 | Status: ON HOLD | COMMUNITY
Start: 1900-01-01

## 2023-07-13 RX ORDER — DEXTROSE 4 G
TAKE 1 TABLET (10 MG) BY ORAL ROUTE ONCE DAILY TABLET,CHEWABLE ORAL 1
Qty: 0 | Refills: 0 | Status: ON HOLD | COMMUNITY
Start: 1900-01-01

## 2023-07-13 RX ORDER — ASPIRIN 81 MG/1
TAKE 1 TABLET (81 MG) BY ORAL ROUTE ONCE DAILY TABLET, COATED ORAL 1
Qty: 0 | Refills: 0 | Status: ACTIVE | COMMUNITY
Start: 1900-01-01

## 2023-07-13 RX ORDER — FAMOTIDINE 40 MG/1
TAKE 1 TABLET BY MOUTH TWICE A DAY TABLET ORAL TWICE A DAY
Qty: 180 TABLET | Refills: 3 | Status: ACTIVE | COMMUNITY

## 2023-07-13 RX ORDER — FLUTICASONE PROPIONATE 50 UG/1
SPRAY 2 SPRAYS (100 MCG) IN EACH NOSTRIL BY INTRANASAL ROUTE ONCE DAILY AS NEEDED SPRAY, METERED NASAL 1
Qty: 1 | Refills: 0 | Status: ON HOLD | COMMUNITY
Start: 1900-01-01

## 2023-07-13 RX ORDER — MULTIVITAMIN WITH IRON
TAKE 1 TABLET BY ORAL ROUTE DAILY TABLET ORAL 1
Qty: 0 | Refills: 0 | Status: ON HOLD | COMMUNITY
Start: 1900-01-01

## 2023-07-13 RX ORDER — OMEPRAZOLE 40 MG/1
TAKE 1 CAPSULE BY ORAL ROUTE EVERY OTHER DAY CAPSULE, DELAYED RELEASE PELLETS ORAL
Qty: 0 | Refills: 0 | Status: ON HOLD | COMMUNITY
Start: 1900-01-01

## 2023-07-13 RX ORDER — CLINDAMYCIN HYDROCHLORIDE 300 MG/1
TAKE 2 CAPSULES BY MOUTH BEFORE PROCEDURE CAPSULE ORAL
Qty: 0 | Refills: 0 | Status: ON HOLD | COMMUNITY
Start: 1900-01-01

## 2023-07-13 RX ORDER — IBUPROFEN 400 MG/1
TABLET ORAL
Qty: 0 | Refills: 0 | Status: ON HOLD | COMMUNITY
Start: 1900-01-01

## 2023-07-13 RX ORDER — CHROMIUM 200 MCG
1 TABLET TABLET ORAL
Status: ACTIVE | COMMUNITY

## 2023-07-13 RX ORDER — METFORMIN HYDROCHLORIDE 1000 MG/1
TAKE 1 TABLET (1,000 MG) BY ORAL ROUTE 2 TIMES PER DAY WITH MORNING AND EVENING MEALS TABLET, COATED ORAL 2
Qty: 0 | Refills: 0 | Status: ACTIVE | COMMUNITY
Start: 1900-01-01

## 2023-07-13 RX ORDER — ATORVASTATIN CALCIUM 20 MG/1
TAKE 1 TABLET (20 MG) BY ORAL ROUTE ONCE DAILY TABLET, FILM COATED ORAL 1
Qty: 0 | Refills: 0 | Status: ACTIVE | COMMUNITY
Start: 1900-01-01

## 2023-07-13 RX ORDER — AZELASTINE HCL 205.5 UG/1
2 SPRAYS IN EACH NOSTRIL SPRAY NASAL
Status: ACTIVE | COMMUNITY

## 2023-07-13 RX ORDER — ESOMEPRAZOLE MAGNESIUM 20 MG/1
1 CAPSULE CAPSULE, DELAYED RELEASE ORAL ONCE A DAY
Qty: 90 CAPSULE | Refills: 3 | Status: ACTIVE | COMMUNITY

## 2023-07-13 RX ORDER — IRBESARTAN AND HYDROCHLOROTHIAZIDE 150; 12.5 MG/1; MG/1
TAKE 2 TABLETS BY ORAL ROUTE ONCE DAILY TABLET ORAL 1
Qty: 0 | Refills: 0 | Status: ACTIVE | COMMUNITY
Start: 1900-01-01

## 2023-07-13 NOTE — HPI-TODAY'S VISIT:
3/5/2020  Patrick presents for colonoscopy follow up. Her Colonoscopy reveals a benign polyp and normal random biopsies. She continues to struggle with 3-4 loose Bms in the am. She thinks it is related to the prilosec 40mg daily. She would like to wean off but is on NSAIDs PRN for recent joint replacement. She is not taking anything for the diarrhea. She has no symptoms of reflux on omeprazole. Today she is struggling with her symptoms. MB   6/29/2020 Patrick presents for follow up of reflux and IBS-D. Since her last visit she stopped PPI and is on famotidine 40mg BID with control of her symptoms. She has an occasional breakthrough when she over eats. This and the florastor have helped her diarrhea but she still has intermittent episodes of loose stool and urgency. Today she is doing better but still with some diarrhea. MB  8/10/2020 Patrick presents for follow up of reflux and IBS-D. She is doing well on famotidine 40mg daily. She is s/p xifaxan with improvement initially but recurrent symptoms. She is on florastor everyother day due to cost. Today she is doing well otherwise but still struggling with her diarrhea. MB  11/2/2020 Mrs. Glover presents for follow-up of reflux, IBS-D, and fatty liver.  Since her last visit she has been doing well.  We did repeat the Xifaxan which helped her diarrhea.  She still has occasional flares every 1 to 3 weeks, she has not used the Imodium yet.  The IBgard seem to increase her reflux, she is doing well on famotidine 40 mg twice daily.  Today she is doing well otherwise with no new GI complaints, but she is working on weight loss for her fatty liver, she has repeat lab work scheduled this winter with Dr. Minor.  MB   5/3/2021 Rox presents for follow-up of reflux and irritable bowel syndrome.  Since her last visit she has been doing about the same.  She is on Pepcid 40 mg twice daily.  She only has occasional heartburn breakthrough when she overeats.  Her bowels have been fairly normal but she still has days of urgency and diarrhea with 1-3 bowel movements in the morning.  She does think the Florastor is helpful.  Xifaxan improves her symptoms if she is flaring.  She agrees her diet likely plays a role and would like to look into the FODMAP diet.  Today she is doing well with no new GI complaints.  MB  5/11/2022 Rox presents for follow-up of reflux, diarrhea, and fatty liver.  Since her last visit she has had a flare of epigastric abdominal pain after meals with nausea bloating and reflux.  She is on famotidine 40 mg twice daily.  She does still have her gallbladder.  She is a diabetic.  She was started on Ozempic 2 months ago but her symptoms were increased before that.  She is never had an upper endoscopy by Dr. Vincent, she may have had one in the distant past by Dr. Cast.  She is never had a gastric emptying study.  Today she is her main complaint is postprandial bloating nausea and epigastric abdominal pain.  She does agree to start low-dose PPI, these have bothered her diarrhea in the past.  Her diarrhea is stable and Florastor daily.  Her last colonoscopy was in 2019 by Dr. Vincent with normal random biopsies and otherwise normal.  MB  6/15/2022 Rox presents for follow up of reflux, diarrhea, and fatty liver with cirrhosis. Since her last visit her US shows cirrhosis. Her contrasted CT confirms this. Her EGD reveals reflux and gastritis. Her GES is positive. She has not had her serologies yet. She has no sequela of the disease so far. She is meeting with nutrition for her gastroparesis this week. Her reflux is stable on pepcid 40mg and nexium in the am. Her stools are loose but at her baseline. Today she is doing well and wants to know if she should continue ozempic as it is likely contributing to her nausea and diarrhea. She wants to give this more time as it has helped her A1C significantly and try to mange her GP with nutrition. MB  This telehealth visit was provided at the patient's home. 7/14/2022 Rox presents for follow-up of reflux, diabetic gastroparesis, and psoriasis.  Since her last visit she has been doing better on Ozempic.  She is cut back on very small meals and is down 22 pounds overall.  Her chronic serologies were negative.  Her meld is 7.  Today we had a long discussion regarding advanced liver disease.  I want to encourage her to continue to work on weight loss, tight blood sugar control, and cholesterol management.  Her liver enzymes are mildly elevated.  She has met with a nutritionist and her gastroparesis overall is doing somewhat better.  She continues to decline any further medications for this.  Her reflux is stable on Nexium in the morning and famotidine at night.  Today overall she is doing fairly well, she has several questions regarding liver disease which we reviewed.  She agrees to see me every 6 months with ultrasound imaging and lab work.  I have encouraged her to continue management of her cholesterol and blood sugar closely with Dr. Minor.  Otherwise she is doing well today.  MB 1/12/2023 Rox presents for follow-up of reflux, gastroparesis, and fatty liver cirrhosis.  Her repeat liver enzymes have normalized, she is lost 50 pounds total in the past 2 years and she remains on Ozempic.  Her reflux is stable on PPI and Pepcid.  Her gastroparesis symptoms have improved, she remains on Ozempic and is eating small frequent meals.  Her bowels are moving regularly.  She has no sequela of her liver disease including no signs of encephalopathy ascites or portal hypertension.  Her EGD this year shows no varices.  Her HCC screening in December shows no new lesions.  Today she is doing well with no new GI complaints.  MB 7/13/2023 Rox presents for follow-up of end-stage liver disease secondary to fatty liver with thrombocytopenia.  Since her last visit her repeat ultrasound is stable with cirrhotic findings no new lesions.  Her synthetic function is normal other than mild thrombocytopenia.  Her liver enzymes have normalized.  Her bowels are moving fairly regularly.  She is not taking the Florastor regularly and has occasional diarrhea but not regularly.  Her reflux is stable and she is only taking the Nexium and Pepcid just as needed.  Today she continues to work on weight loss.  She is off Ozempic and maintaining her weight along with her blood sugars.  She denies any new GI complaints.  MB

## 2023-09-12 NOTE — PHYSICAL EXAM CHEST:
Goal Outcome Evaluation:              Outcome Evaluation: aox4. medicated for c/o pain per mar. blood glucose monitored. wound and skin care provided per orders. encouraged frequent turning and repositioning while in bed.          breathing is unlabored without accessory muscle use. , normal breath sounds.

## 2023-09-18 ENCOUNTER — WEB ENCOUNTER (OUTPATIENT)
Dept: URBAN - NONMETROPOLITAN AREA CLINIC 2 | Facility: CLINIC | Age: 63
End: 2023-09-18

## 2023-09-18 RX ORDER — FAMOTIDINE 40 MG/1
1 TABLET TABLET ORAL DAILY
Qty: 90 TABLET | Refills: 3

## 2023-11-15 ENCOUNTER — WEB ENCOUNTER (OUTPATIENT)
Dept: URBAN - NONMETROPOLITAN AREA CLINIC 2 | Facility: CLINIC | Age: 63
End: 2023-11-15

## 2023-12-13 ENCOUNTER — LAB OUTSIDE AN ENCOUNTER (OUTPATIENT)
Dept: URBAN - NONMETROPOLITAN AREA CLINIC 2 | Facility: CLINIC | Age: 63
End: 2023-12-13

## 2023-12-13 ENCOUNTER — WEB ENCOUNTER (OUTPATIENT)
Dept: URBAN - NONMETROPOLITAN AREA CLINIC 2 | Facility: CLINIC | Age: 63
End: 2023-12-13

## 2023-12-13 LAB
A/G RATIO: 1.6
ALBUMIN: 4.7
ALKALINE PHOSPHATASE: 78
ALT (SGPT): 29
ANION GAP: 12
AST (SGOT): 31
BASOPHILS AUTOMATED ABSOLUTE COUNT: 0
BASOPHILS RELATIVE PERCENT: 0.6
BILIRUBIN TOTAL: 1.1
BLOOD UREA NITROGEN: 16
BUN / CREAT RATIO: 27
CALCIUM: 9.6
CHLORIDE: 102
CO2: 32
CREATININE, SERUM: 0.6
EGFR (CKD-EPI): >60
EOSINOPHILS AUTOMATED ABSOLUTE COUNT: 0.2
EOSINOPHILS RELATIVE PERCENT: 3.3
GLUCOSE: 80
HEMATOCRIT: 40.5
HEMOGLOBIN: 13.7
INR: 1.09
LYMPHOCYTES AUTOMATED ABSOLUTE COUNT: 2.2
LYMPHOCYTES RELATIVE PERCENT: 32.4
MEAN CORPUSCULAR HEMOGLOBIN CONC: 33.8
MEAN CORPUSCULAR HEMOGLOBIN: 30.5
MEAN CORPUSCULAR VOLUME: 90.2
MEAN PLATELET VOLUME: 10.8
MONOCYTES AUTOMATED ABSOLUTE COUNT: 0.6
MONOCYTES RELATIVE PERCENT: 8.9
NEUTROPHILS AUTOMATED ABSOLUTE: 3.7
NEUTROPHILS RELATIVE PERCENT: 54.8
PLATELET COUNT: 134
POTASSIUM: 4
PROTEIN TOTAL: 7.6
PROTHROMBIN TIME: 11.5
RED BLOOD CELL COUNT: 4.49
RED CELL DISTRIBUTION WIDTH: 13.5
SODIUM: 142
WHITE BLOOD CELL COUNT: 6.8

## 2023-12-21 ENCOUNTER — WEB ENCOUNTER (OUTPATIENT)
Dept: URBAN - NONMETROPOLITAN AREA CLINIC 2 | Facility: CLINIC | Age: 63
End: 2023-12-21

## 2024-01-13 ENCOUNTER — LAB OUTSIDE AN ENCOUNTER (OUTPATIENT)
Dept: URBAN - NONMETROPOLITAN AREA CLINIC 2 | Facility: CLINIC | Age: 64
End: 2024-01-13

## 2024-01-25 ENCOUNTER — OFFICE VISIT (OUTPATIENT)
Dept: URBAN - NONMETROPOLITAN AREA CLINIC 2 | Facility: CLINIC | Age: 64
End: 2024-01-25
Payer: MEDICARE

## 2024-01-25 ENCOUNTER — DASHBOARD ENCOUNTERS (OUTPATIENT)
Age: 64
End: 2024-01-25

## 2024-01-25 VITALS
HEART RATE: 72 BPM | DIASTOLIC BLOOD PRESSURE: 78 MMHG | HEIGHT: 64 IN | SYSTOLIC BLOOD PRESSURE: 175 MMHG | TEMPERATURE: 98 F | BODY MASS INDEX: 36.37 KG/M2 | WEIGHT: 213 LBS

## 2024-01-25 DIAGNOSIS — K21.9 GERD (GASTROESOPHAGEAL REFLUX DISEASE): ICD-10-CM

## 2024-01-25 DIAGNOSIS — R10.13 EPIGASTRIC ABDOMINAL PAIN: ICD-10-CM

## 2024-01-25 DIAGNOSIS — R19.7 DIARRHEA: ICD-10-CM

## 2024-01-25 DIAGNOSIS — R11.0 NAUSEA: ICD-10-CM

## 2024-01-25 DIAGNOSIS — K76.0 FATTY LIVER: ICD-10-CM

## 2024-01-25 DIAGNOSIS — K31.84 GASTROPARESIS: ICD-10-CM

## 2024-01-25 DIAGNOSIS — E66.01 MORBID OBESITY: ICD-10-CM

## 2024-01-25 DIAGNOSIS — Z12.11 ROUTINE COLON: ICD-10-CM

## 2024-01-25 DIAGNOSIS — K74.60 HEPATIC CIRRHOSIS, UNSPECIFIED HEPATIC CIRRHOSIS TYPE, UNSPECIFIED WHETHER ASCITES PRESENT: ICD-10-CM

## 2024-01-25 DIAGNOSIS — K64.9 HEMORRHOIDS, UNSPECIFIED HEMORRHOID TYPE: ICD-10-CM

## 2024-01-25 PROBLEM — 19943007: Status: ACTIVE | Noted: 2022-06-15

## 2024-01-25 PROBLEM — 70153002: Status: ACTIVE | Noted: 2024-01-25

## 2024-01-25 PROCEDURE — 99214 OFFICE O/P EST MOD 30 MIN: CPT | Performed by: NURSE PRACTITIONER

## 2024-01-25 RX ORDER — ATORVASTATIN CALCIUM 20 MG/1
TAKE 1 TABLET (20 MG) BY ORAL ROUTE ONCE DAILY TABLET, FILM COATED ORAL 1
Qty: 0 | Refills: 0 | Status: ACTIVE | COMMUNITY
Start: 1900-01-01

## 2024-01-25 RX ORDER — ASPIRIN 81 MG/1
TAKE 1 TABLET (81 MG) BY ORAL ROUTE ONCE DAILY TABLET, COATED ORAL 1
Qty: 0 | Refills: 0 | Status: ACTIVE | COMMUNITY
Start: 1900-01-01

## 2024-01-25 RX ORDER — AZELASTINE HCL 205.5 UG/1
2 SPRAYS IN EACH NOSTRIL SPRAY NASAL
Status: ACTIVE | COMMUNITY

## 2024-01-25 RX ORDER — METFORMIN HYDROCHLORIDE 500 MG/1
TAKE 1 TABLET (1,000 MG) BY ORAL ROUTE 2 TIMES PER DAY WITH MORNING AND EVENING MEALS TABLET, FILM COATED ORAL 2
Qty: 0 | Refills: 0 | Status: ACTIVE | COMMUNITY
Start: 1900-01-01

## 2024-01-25 RX ORDER — CHROMIUM 200 MCG
1 TABLET TABLET ORAL
Status: ACTIVE | COMMUNITY

## 2024-01-25 RX ORDER — ESOMEPRAZOLE MAGNESIUM 20 MG/1
1 CAPSULE CAPSULE, DELAYED RELEASE ORAL ONCE A DAY
Qty: 90 CAPSULE | Refills: 3 | Status: ACTIVE | COMMUNITY

## 2024-01-25 RX ORDER — FAMOTIDINE 40 MG/1
1 TABLET TABLET ORAL DAILY
Qty: 90 TABLET | Refills: 3 | Status: ACTIVE | COMMUNITY

## 2024-01-25 RX ORDER — IRBESARTAN AND HYDROCHLOROTHIAZIDE 150; 12.5 MG/1; MG/1
TAKE 2 TABLETS BY ORAL ROUTE ONCE DAILY TABLET ORAL 1
Qty: 0 | Refills: 0 | Status: ACTIVE | COMMUNITY
Start: 1900-01-01

## 2024-01-25 NOTE — HPI-TODAY'S VISIT:
3/5/2020  Patrick presents for colonoscopy follow up. Her Colonoscopy reveals a benign polyp and normal random biopsies. She continues to struggle with 3-4 loose Bms in the am. She thinks it is related to the prilosec 40mg daily. She would like to wean off but is on NSAIDs PRN for recent joint replacement. She is not taking anything for the diarrhea. She has no symptoms of reflux on omeprazole. Today she is struggling with her symptoms. MB   6/29/2020 Patrick presents for follow up of reflux and IBS-D. Since her last visit she stopped PPI and is on famotidine 40mg BID with control of her symptoms. She has an occasional breakthrough when she over eats. This and the florastor have helped her diarrhea but she still has intermittent episodes of loose stool and urgency. Today she is doing better but still with some diarrhea. MB  8/10/2020 Patrick presents for follow up of reflux and IBS-D. She is doing well on famotidine 40mg daily. She is s/p xifaxan with improvement initially but recurrent symptoms. She is on florastor everyother day due to cost. Today she is doing well otherwise but still struggling with her diarrhea. MB  11/2/2020 Mrs. Glover presents for follow-up of reflux, IBS-D, and fatty liver.  Since her last visit she has been doing well.  We did repeat the Xifaxan which helped her diarrhea.  She still has occasional flares every 1 to 3 weeks, she has not used the Imodium yet.  The IBgard seem to increase her reflux, she is doing well on famotidine 40 mg twice daily.  Today she is doing well otherwise with no new GI complaints, but she is working on weight loss for her fatty liver, she has repeat lab work scheduled this winter with Dr. Minor.  MB   5/3/2021 Rox presents for follow-up of reflux and irritable bowel syndrome.  Since her last visit she has been doing about the same.  She is on Pepcid 40 mg twice daily.  She only has occasional heartburn breakthrough when she overeats.  Her bowels have been fairly normal but she still has days of urgency and diarrhea with 1-3 bowel movements in the morning.  She does think the Florastor is helpful.  Xifaxan improves her symptoms if she is flaring.  She agrees her diet likely plays a role and would like to look into the FODMAP diet.  Today she is doing well with no new GI complaints.  MB  5/11/2022 Rox presents for follow-up of reflux, diarrhea, and fatty liver.  Since her last visit she has had a flare of epigastric abdominal pain after meals with nausea bloating and reflux.  She is on famotidine 40 mg twice daily.  She does still have her gallbladder.  She is a diabetic.  She was started on Ozempic 2 months ago but her symptoms were increased before that.  She is never had an upper endoscopy by Dr. Vincent, she may have had one in the distant past by Dr. Cast.  She is never had a gastric emptying study.  Today she is her main complaint is postprandial bloating nausea and epigastric abdominal pain.  She does agree to start low-dose PPI, these have bothered her diarrhea in the past.  Her diarrhea is stable and Florastor daily.  Her last colonoscopy was in 2019 by Dr. Vincent with normal random biopsies and otherwise normal.  MB  6/15/2022 Rox presents for follow up of reflux, diarrhea, and fatty liver with cirrhosis. Since her last visit her US shows cirrhosis. Her contrasted CT confirms this. Her EGD reveals reflux and gastritis. Her GES is positive. She has not had her serologies yet. She has no sequela of the disease so far. She is meeting with nutrition for her gastroparesis this week. Her reflux is stable on pepcid 40mg and nexium in the am. Her stools are loose but at her baseline. Today she is doing well and wants to know if she should continue ozempic as it is likely contributing to her nausea and diarrhea. She wants to give this more time as it has helped her A1C significantly and try to mange her GP with nutrition. MB  This telehealth visit was provided at the patient's home. 7/14/2022 Rox presents for follow-up of reflux, diabetic gastroparesis, and psoriasis.  Since her last visit she has been doing better on Ozempic.  She is cut back on very small meals and is down 22 pounds overall.  Her chronic serologies were negative.  Her meld is 7.  Today we had a long discussion regarding advanced liver disease.  I want to encourage her to continue to work on weight loss, tight blood sugar control, and cholesterol management.  Her liver enzymes are mildly elevated.  She has met with a nutritionist and her gastroparesis overall is doing somewhat better.  She continues to decline any further medications for this.  Her reflux is stable on Nexium in the morning and famotidine at night.  Today overall she is doing fairly well, she has several questions regarding liver disease which we reviewed.  She agrees to see me every 6 months with ultrasound imaging and lab work.  I have encouraged her to continue management of her cholesterol and blood sugar closely with Dr. Minor.  Otherwise she is doing well today.  MB 1/12/2023 Rox presents for follow-up of reflux, gastroparesis, and fatty liver cirrhosis.  Her repeat liver enzymes have normalized, she is lost 50 pounds total in the past 2 years and she remains on Ozempic.  Her reflux is stable on PPI and Pepcid.  Her gastroparesis symptoms have improved, she remains on Ozempic and is eating small frequent meals.  Her bowels are moving regularly.  She has no sequela of her liver disease including no signs of encephalopathy ascites or portal hypertension.  Her EGD this year shows no varices.  Her HCC screening in December shows no new lesions.  Today she is doing well with no new GI complaints.  MB 7/13/2023 Rox presents for follow-up of end-stage liver disease secondary to fatty liver with thrombocytopenia.  Since her last visit her repeat ultrasound is stable with cirrhotic findings no new lesions.  Her synthetic function is normal other than mild thrombocytopenia.  Her liver enzymes have normalized.  Her bowels are moving fairly regularly.  She is not taking the Florastor regularly and has occasional diarrhea but not regularly.  Her reflux is stable and she is only taking the Nexium and Pepcid just as needed.  Today she continues to work on weight loss.  She is off Ozempic and maintaining her weight along with her blood sugars.  She denies any new GI complaints.  MB 1/25/2024 Rox presents for follow-up of fatty liver cirrhosis.  Her repeat MRI is stable with cirrhotic morphology of the liver, her lab work is stable with stable platelets and bilirubin.  She is maintaining her weight loss.  She is due for colonoscopy this coming December, we will repeat EGD with [facility]'s screening at that time.  Today she denies any new GI complaints.  MB

## 2024-05-16 ENCOUNTER — WEB ENCOUNTER (OUTPATIENT)
Dept: URBAN - NONMETROPOLITAN AREA CLINIC 2 | Facility: CLINIC | Age: 64
End: 2024-05-16

## 2024-05-18 ENCOUNTER — WEB ENCOUNTER (OUTPATIENT)
Dept: URBAN - NONMETROPOLITAN AREA CLINIC 2 | Facility: CLINIC | Age: 64
End: 2024-05-18

## 2024-06-15 ENCOUNTER — WEB ENCOUNTER (OUTPATIENT)
Dept: URBAN - NONMETROPOLITAN AREA CLINIC 2 | Facility: CLINIC | Age: 64
End: 2024-06-15

## 2024-07-19 ENCOUNTER — LAB OUTSIDE AN ENCOUNTER (OUTPATIENT)
Dept: URBAN - NONMETROPOLITAN AREA CLINIC 2 | Facility: CLINIC | Age: 64
End: 2024-07-19

## 2024-07-19 LAB
A/G RATIO: 1.5
ALBUMIN: 4.6
ALKALINE PHOSPHATASE: 69
ALT (SGPT): 22
ANION GAP: 14
AST (SGOT): 28
BASOPHILS AUTOMATED ABSOLUTE COUNT: 0
BASOPHILS RELATIVE PERCENT: 0.7
BILIRUBIN TOTAL: 1.1
BLOOD UREA NITROGEN: 17
BUN / CREAT RATIO: 29
CALCIUM: 9.7
CHLORIDE: 102
CO2: 30
CREATININE, SERUM: 0.58
EGFR (CKD-EPI): >60
EOSINOPHILS AUTOMATED ABSOLUTE COUNT: 0.3
EOSINOPHILS RELATIVE PERCENT: 4.3
GLUCOSE: 119
HEMATOCRIT: 40.1
HEMOGLOBIN: 13.3
INR: 0.96
LYMPHOCYTES AUTOMATED ABSOLUTE COUNT: 2.1
LYMPHOCYTES RELATIVE PERCENT: 29.9
MEAN CORPUSCULAR HEMOGLOBIN CONC: 33.3
MEAN CORPUSCULAR HEMOGLOBIN: 29.6
MEAN CORPUSCULAR VOLUME: 89.1
MEAN PLATELET VOLUME: 9.8
MONOCYTES AUTOMATED ABSOLUTE COUNT: 0.5
MONOCYTES RELATIVE PERCENT: 7.1
NEUTROPHILS AUTOMATED ABSOLUTE: 4.1
NEUTROPHILS RELATIVE PERCENT: 58
PLATELET COUNT: 131
POTASSIUM: 4.3
PROTEIN TOTAL: 7.6
PROTHROMBIN TIME: 11.2
RED BLOOD CELL COUNT: 4.5
RED CELL DISTRIBUTION WIDTH: 13.6
SODIUM: 142
WHITE BLOOD CELL COUNT: 7.1

## 2024-07-25 ENCOUNTER — LAB OUTSIDE AN ENCOUNTER (OUTPATIENT)
Dept: URBAN - NONMETROPOLITAN AREA CLINIC 2 | Facility: CLINIC | Age: 64
End: 2024-07-25

## 2024-08-20 ENCOUNTER — LAB OUTSIDE AN ENCOUNTER (OUTPATIENT)
Dept: URBAN - NONMETROPOLITAN AREA CLINIC 2 | Facility: CLINIC | Age: 64
End: 2024-08-20

## 2024-08-20 ENCOUNTER — OFFICE VISIT (OUTPATIENT)
Dept: URBAN - NONMETROPOLITAN AREA CLINIC 2 | Facility: CLINIC | Age: 64
End: 2024-08-20
Payer: MEDICARE

## 2024-08-20 VITALS
HEIGHT: 64 IN | HEART RATE: 67 BPM | BODY MASS INDEX: 37.56 KG/M2 | WEIGHT: 220 LBS | DIASTOLIC BLOOD PRESSURE: 83 MMHG | SYSTOLIC BLOOD PRESSURE: 159 MMHG

## 2024-08-20 DIAGNOSIS — R53.83 FATIGUE: ICD-10-CM

## 2024-08-20 DIAGNOSIS — R19.7 ACUTE DIARRHEA: ICD-10-CM

## 2024-08-20 DIAGNOSIS — K74.69 OTHER CIRRHOSIS OF LIVER: ICD-10-CM

## 2024-08-20 PROBLEM — 88111009: Status: ACTIVE | Noted: 2024-08-20

## 2024-08-20 PROBLEM — 84229001: Status: ACTIVE | Noted: 2024-08-20

## 2024-08-20 LAB
AMMONIA: 24
BASOPHILS AUTOMATED ABSOLUTE COUNT: 0
BASOPHILS RELATIVE PERCENT: 0.6
EOSINOPHILS AUTOMATED ABSOLUTE COUNT: 0.2
EOSINOPHILS RELATIVE PERCENT: 2.7
FERRITIN: 33.1
HEMATOCRIT: 40.3
HEMOGLOBIN: 13.3
IRON SATURATION: 28
IRON: 95
LYMPHOCYTES AUTOMATED ABSOLUTE COUNT: 1.9
LYMPHOCYTES RELATIVE PERCENT: 25.7
MEAN CORPUSCULAR HEMOGLOBIN CONC: 32.9
MEAN CORPUSCULAR HEMOGLOBIN: 29.8
MEAN CORPUSCULAR VOLUME: 90.7
MEAN PLATELET VOLUME: 10.4
MONOCYTES AUTOMATED ABSOLUTE COUNT: 0.5
MONOCYTES RELATIVE PERCENT: 6.9
NEUTROPHILS AUTOMATED ABSOLUTE: 4.8
NEUTROPHILS RELATIVE PERCENT: 64.1
PLATELET COUNT: 136
RED BLOOD CELL COUNT: 4.44
RED CELL DISTRIBUTION WIDTH: 13.4
TOTAL IRON BINDING CAPACITY: 341
UNSATURATED IRON BINDING CAPACITY: 246
VITAMIN B-12: 349
WHITE BLOOD CELL COUNT: 7.5

## 2024-08-20 PROCEDURE — 99214 OFFICE O/P EST MOD 30 MIN: CPT | Performed by: NURSE PRACTITIONER

## 2024-08-20 RX ORDER — ASPIRIN 81 MG/1
TAKE 1 TABLET (81 MG) BY ORAL ROUTE ONCE DAILY TABLET, COATED ORAL 1
Qty: 0 | Refills: 0 | Status: ACTIVE | COMMUNITY
Start: 1900-01-01

## 2024-08-20 RX ORDER — AZELASTINE HYDROCHLORIDE 205.5 UG/1
2 SPRAYS IN EACH NOSTRIL SPRAY, METERED NASAL
Status: ACTIVE | COMMUNITY

## 2024-08-20 RX ORDER — CHROMIUM 200 MCG
1 TABLET TABLET ORAL
Status: ACTIVE | COMMUNITY

## 2024-08-20 RX ORDER — FAMOTIDINE 40 MG/1
1 TABLET TABLET ORAL DAILY
Qty: 90 TABLET | Refills: 3 | Status: ACTIVE | COMMUNITY

## 2024-08-20 RX ORDER — METFORMIN HYDROCHLORIDE 500 MG/1
TAKE 1 TABLET (1,000 MG) BY ORAL ROUTE 2 TIMES PER DAY WITH MORNING AND EVENING MEALS TABLET, FILM COATED ORAL 2
Qty: 0 | Refills: 0 | Status: ACTIVE | COMMUNITY
Start: 1900-01-01

## 2024-08-20 RX ORDER — IRBESARTAN AND HYDROCHLOROTHIAZIDE 150; 12.5 MG/1; MG/1
TAKE 2 TABLETS BY ORAL ROUTE ONCE DAILY TABLET ORAL 1
Qty: 0 | Refills: 0 | Status: ACTIVE | COMMUNITY
Start: 1900-01-01

## 2024-08-20 RX ORDER — ATORVASTATIN CALCIUM 20 MG/1
TAKE 1 TABLET (20 MG) BY ORAL ROUTE ONCE DAILY TABLET, FILM COATED ORAL 1
Qty: 0 | Refills: 0 | Status: ACTIVE | COMMUNITY
Start: 1900-01-01

## 2024-08-20 RX ORDER — ESOMEPRAZOLE MAGNESIUM 20 MG/1
1 CAPSULE CAPSULE, DELAYED RELEASE ORAL ONCE A DAY
Qty: 90 CAPSULE | Refills: 3 | Status: ACTIVE | COMMUNITY

## 2024-08-20 NOTE — HPI-TODAY'S VISIT:
3/5/2020  Patrick presents for colonoscopy follow up. Her Colonoscopy reveals a benign polyp and normal random biopsies. She continues to struggle with 3-4 loose Bms in the am. She thinks it is related to the prilosec 40mg daily. She would like to wean off but is on NSAIDs PRN for recent joint replacement. She is not taking anything for the diarrhea. She has no symptoms of reflux on omeprazole. Today she is struggling with her symptoms. MB   6/29/2020 Patrick presents for follow up of reflux and IBS-D. Since her last visit she stopped PPI and is on famotidine 40mg BID with control of her symptoms. She has an occasional breakthrough when she over eats. This and the florastor have helped her diarrhea but she still has intermittent episodes of loose stool and urgency. Today she is doing better but still with some diarrhea. MB  8/10/2020 Patrick presents for follow up of reflux and IBS-D. She is doing well on famotidine 40mg daily. She is s/p xifaxan with improvement initially but recurrent symptoms. She is on florastor everyother day due to cost. Today she is doing well otherwise but still struggling with her diarrhea. MB  11/2/2020 Mrs. Glover presents for follow-up of reflux, IBS-D, and fatty liver.  Since her last visit she has been doing well.  We did repeat the Xifaxan which helped her diarrhea.  She still has occasional flares every 1 to 3 weeks, she has not used the Imodium yet.  The IBgard seem to increase her reflux, she is doing well on famotidine 40 mg twice daily.  Today she is doing well otherwise with no new GI complaints, but she is working on weight loss for her fatty liver, she has repeat lab work scheduled this winter with Dr. Minor.  MB   5/3/2021 Rox presents for follow-up of reflux and irritable bowel syndrome.  Since her last visit she has been doing about the same.  She is on Pepcid 40 mg twice daily.  She only has occasional heartburn breakthrough when she overeats.  Her bowels have been fairly normal but she still has days of urgency and diarrhea with 1-3 bowel movements in the morning.  She does think the Florastor is helpful.  Xifaxan improves her symptoms if she is flaring.  She agrees her diet likely plays a role and would like to look into the FODMAP diet.  Today she is doing well with no new GI complaints.  MB  5/11/2022 Rox presents for follow-up of reflux, diarrhea, and fatty liver.  Since her last visit she has had a flare of epigastric abdominal pain after meals with nausea bloating and reflux.  She is on famotidine 40 mg twice daily.  She does still have her gallbladder.  She is a diabetic.  She was started on Ozempic 2 months ago but her symptoms were increased before that.  She is never had an upper endoscopy by Dr. Vincent, she may have had one in the distant past by Dr. Cast.  She is never had a gastric emptying study.  Today she is her main complaint is postprandial bloating nausea and epigastric abdominal pain.  She does agree to start low-dose PPI, these have bothered her diarrhea in the past.  Her diarrhea is stable and Florastor daily.  Her last colonoscopy was in 2019 by Dr. Vincent with normal random biopsies and otherwise normal.  MB  6/15/2022 Rox presents for follow up of reflux, diarrhea, and fatty liver with cirrhosis. Since her last visit her US shows cirrhosis. Her contrasted CT confirms this. Her EGD reveals reflux and gastritis. Her GES is positive. She has not had her serologies yet. She has no sequela of the disease so far. She is meeting with nutrition for her gastroparesis this week. Her reflux is stable on pepcid 40mg and nexium in the am. Her stools are loose but at her baseline. Today she is doing well and wants to know if she should continue ozempic as it is likely contributing to her nausea and diarrhea. She wants to give this more time as it has helped her A1C significantly and try to mange her GP with nutrition. MB  This telehealth visit was provided at the patient's home. 7/14/2022 Rox presents for follow-up of reflux, diabetic gastroparesis, and psoriasis.  Since her last visit she has been doing better on Ozempic.  She is cut back on very small meals and is down 22 pounds overall.  Her chronic serologies were negative.  Her meld is 7.  Today we had a long discussion regarding advanced liver disease.  I want to encourage her to continue to work on weight loss, tight blood sugar control, and cholesterol management.  Her liver enzymes are mildly elevated.  She has met with a nutritionist and her gastroparesis overall is doing somewhat better.  She continues to decline any further medications for this.  Her reflux is stable on Nexium in the morning and famotidine at night.  Today overall she is doing fairly well, she has several questions regarding liver disease which we reviewed.  She agrees to see me every 6 months with ultrasound imaging and lab work.  I have encouraged her to continue management of her cholesterol and blood sugar closely with Dr. Minor.  Otherwise she is doing well today.  MB 1/12/2023 Rox presents for follow-up of reflux, gastroparesis, and fatty liver cirrhosis.  Her repeat liver enzymes have normalized, she is lost 50 pounds total in the past 2 years and she remains on Ozempic.  Her reflux is stable on PPI and Pepcid.  Her gastroparesis symptoms have improved, she remains on Ozempic and is eating small frequent meals.  Her bowels are moving regularly.  She has no sequela of her liver disease including no signs of encephalopathy ascites or portal hypertension.  Her EGD this year shows no varices.  Her HCC screening in December shows no new lesions.  Today she is doing well with no new GI complaints.  MB 7/13/2023 Rox presents for follow-up of end-stage liver disease secondary to fatty liver with thrombocytopenia.  Since her last visit her repeat ultrasound is stable with cirrhotic findings no new lesions.  Her synthetic function is normal other than mild thrombocytopenia.  Her liver enzymes have normalized.  Her bowels are moving fairly regularly.  She is not taking the Florastor regularly and has occasional diarrhea but not regularly.  Her reflux is stable and she is only taking the Nexium and Pepcid just as needed.  Today she continues to work on weight loss.  She is off Ozempic and maintaining her weight along with her blood sugars.  She denies any new GI complaints.  MB 1/25/2024 Rox presents for follow-up of fatty liver cirrhosis.  Her repeat MRI is stable with cirrhotic morphology of the liver, her lab work is stable with stable platelets and bilirubin.  She is maintaining her weight loss.  She is due for colonoscopy this coming December, we will repeat EGD with [facility]'s screening at that time.  Today she denies any new GI complaints.  MB 8/20/2024 The patient presents for follow-up of NAFLD D cirrhosis.  Since her last visit she is reported increased fatigue and dark stools.  Her labs last month showed no anemia but elevated BUN/creatinine ratio.  She is on low-dose Nexium which she is taking every other day alternating with famotidine 40 mg every other day.  She is due for EGD for [facility]'s screening this fall with her colonoscopy.  Her synthetic function remains normal.  Her bowels are somewhat loose, she is taking Florastor but not consistently.  Today she is due for EGD and colonoscopy this fall we will set this up care.  Given her fatigue and dark stools we will check her labs again along with her ammonia B12 and iron studies.  Consider further workup pending her results.  MB

## 2024-08-21 ENCOUNTER — TELEPHONE ENCOUNTER (OUTPATIENT)
Dept: URBAN - NONMETROPOLITAN AREA CLINIC 2 | Facility: CLINIC | Age: 64
End: 2024-08-21

## 2024-09-01 ENCOUNTER — WEB ENCOUNTER (OUTPATIENT)
Dept: URBAN - NONMETROPOLITAN AREA CLINIC 2 | Facility: CLINIC | Age: 64
End: 2024-09-01

## 2024-12-13 ENCOUNTER — WEB ENCOUNTER (OUTPATIENT)
Dept: URBAN - NONMETROPOLITAN AREA CLINIC 2 | Facility: CLINIC | Age: 64
End: 2024-12-13

## 2024-12-13 RX ORDER — FAMOTIDINE 40 MG/1
1 TABLET TABLET ORAL DAILY
Qty: 90 TABLET | Refills: 3

## 2025-01-15 ENCOUNTER — WEB ENCOUNTER (OUTPATIENT)
Dept: URBAN - NONMETROPOLITAN AREA CLINIC 2 | Facility: CLINIC | Age: 65
End: 2025-01-15

## 2025-01-16 ENCOUNTER — WEB ENCOUNTER (OUTPATIENT)
Dept: URBAN - NONMETROPOLITAN AREA CLINIC 2 | Facility: CLINIC | Age: 65
End: 2025-01-16

## 2025-01-20 ENCOUNTER — WEB ENCOUNTER (OUTPATIENT)
Dept: URBAN - NONMETROPOLITAN AREA CLINIC 2 | Facility: CLINIC | Age: 65
End: 2025-01-20

## 2025-01-20 ENCOUNTER — LAB OUTSIDE AN ENCOUNTER (OUTPATIENT)
Dept: URBAN - NONMETROPOLITAN AREA CLINIC 2 | Facility: CLINIC | Age: 65
End: 2025-01-20

## 2025-01-31 ENCOUNTER — WEB ENCOUNTER (OUTPATIENT)
Dept: URBAN - NONMETROPOLITAN AREA CLINIC 2 | Facility: CLINIC | Age: 65
End: 2025-01-31

## 2025-02-06 ENCOUNTER — OFFICE VISIT (OUTPATIENT)
Dept: URBAN - METROPOLITAN AREA MEDICAL CENTER 1 | Facility: MEDICAL CENTER | Age: 65
End: 2025-02-06

## 2025-03-04 ENCOUNTER — LAB OUTSIDE AN ENCOUNTER (OUTPATIENT)
Dept: URBAN - NONMETROPOLITAN AREA CLINIC 2 | Facility: CLINIC | Age: 65
End: 2025-03-04

## 2025-03-04 ENCOUNTER — TELEPHONE ENCOUNTER (OUTPATIENT)
Dept: URBAN - NONMETROPOLITAN AREA CLINIC 2 | Facility: CLINIC | Age: 65
End: 2025-03-04

## 2025-03-04 LAB
A/G RATIO: 1.5
ALBUMIN: 4.6
ALKALINE PHOSPHATASE: 71
ALT (SGPT): 26
ANION GAP: 13
AST (SGOT): 31
BASOPHILS AUTOMATED ABSOLUTE COUNT: 0
BASOPHILS RELATIVE PERCENT: 0.5
BILIRUBIN TOTAL: 0.9
BLOOD UREA NITROGEN: 18
BUN / CREAT RATIO: 28
CALCIUM: 10.1
CHLORIDE: 103
CO2: 28
CREATININE, SERUM: 0.65
EGFR (CKD-EPI): >60
EOSINOPHILS AUTOMATED ABSOLUTE COUNT: 0.2
EOSINOPHILS RELATIVE PERCENT: 2.4
GLUCOSE: 104
HEMATOCRIT: 40.1
HEMOGLOBIN: 13.5
INR: 1.04
LYMPHOCYTES AUTOMATED ABSOLUTE COUNT: 2
LYMPHOCYTES RELATIVE PERCENT: 30.9
MEAN CORPUSCULAR HEMOGLOBIN CONC: 33.7
MEAN CORPUSCULAR HEMOGLOBIN: 30.2
MEAN CORPUSCULAR VOLUME: 89.6
MEAN PLATELET VOLUME: 10.2
MONOCYTES AUTOMATED ABSOLUTE COUNT: 0.4
MONOCYTES RELATIVE PERCENT: 6.9
NEUTROPHILS AUTOMATED ABSOLUTE: 3.8
NEUTROPHILS RELATIVE PERCENT: 59.3
PLATELET COUNT: 135
POTASSIUM: 3.9
PROTEIN TOTAL: 7.6
PROTHROMBIN TIME: 11.6
RED BLOOD CELL COUNT: 4.48
RED CELL DISTRIBUTION WIDTH: 13.6
SODIUM: 140
WHITE BLOOD CELL COUNT: 6.4

## 2025-03-06 ENCOUNTER — TELEPHONE ENCOUNTER (OUTPATIENT)
Dept: URBAN - NONMETROPOLITAN AREA CLINIC 2 | Facility: CLINIC | Age: 65
End: 2025-03-06

## 2025-03-13 ENCOUNTER — OFFICE VISIT (OUTPATIENT)
Dept: URBAN - NONMETROPOLITAN AREA CLINIC 2 | Facility: CLINIC | Age: 65
End: 2025-03-13

## 2025-03-17 ENCOUNTER — OFFICE VISIT (OUTPATIENT)
Dept: URBAN - NONMETROPOLITAN AREA CLINIC 2 | Facility: CLINIC | Age: 65
End: 2025-03-17
Payer: COMMERCIAL

## 2025-03-17 VITALS
WEIGHT: 225 LBS | BODY MASS INDEX: 38.41 KG/M2 | HEART RATE: 66 BPM | DIASTOLIC BLOOD PRESSURE: 74 MMHG | SYSTOLIC BLOOD PRESSURE: 154 MMHG | HEIGHT: 64 IN

## 2025-03-17 DIAGNOSIS — K74.60 HEPATIC CIRRHOSIS, UNSPECIFIED HEPATIC CIRRHOSIS TYPE, UNSPECIFIED WHETHER ASCITES PRESENT: ICD-10-CM

## 2025-03-17 DIAGNOSIS — R19.7 DIARRHEA: ICD-10-CM

## 2025-03-17 DIAGNOSIS — K31.84 GASTROPARESIS: ICD-10-CM

## 2025-03-17 DIAGNOSIS — K64.9 HEMORRHOIDS, UNSPECIFIED HEMORRHOID TYPE: ICD-10-CM

## 2025-03-17 DIAGNOSIS — K86.2 PANCREATIC CYST: ICD-10-CM

## 2025-03-17 DIAGNOSIS — K21.9 GERD (GASTROESOPHAGEAL REFLUX DISEASE): ICD-10-CM

## 2025-03-17 DIAGNOSIS — K59.4 PROCTALGIA FUGAX: ICD-10-CM

## 2025-03-17 DIAGNOSIS — Z86.0101 PERSONAL HISTORY OF ADENOMATOUS AND SERRATED COLON POLYPS: ICD-10-CM

## 2025-03-17 PROBLEM — 31258000: Status: ACTIVE | Noted: 2025-03-17

## 2025-03-17 PROBLEM — 428283002: Status: ACTIVE | Noted: 2025-03-17

## 2025-03-17 PROBLEM — 62647006: Status: ACTIVE | Noted: 2025-03-17

## 2025-03-17 PROCEDURE — 99214 OFFICE O/P EST MOD 30 MIN: CPT | Performed by: NURSE PRACTITIONER

## 2025-03-17 RX ORDER — IRBESARTAN AND HYDROCHLOROTHIAZIDE 150; 12.5 MG/1; MG/1
TAKE 2 TABLETS BY ORAL ROUTE ONCE DAILY TABLET ORAL 1
Qty: 0 | Refills: 0 | Status: ACTIVE | COMMUNITY
Start: 1900-01-01

## 2025-03-17 RX ORDER — HYOSCYAMINE SULFATE 0.12 MG/1
1 TABLET UNDER THE TONGUE AND ALLOW TO DISSOLVE AS NEEDED FOR SPASM TABLET SUBLINGUAL THREE TIMES A DAY
Qty: 90 TABLET | Refills: 3 | OUTPATIENT
Start: 2025-03-17 | End: 2025-07-15

## 2025-03-17 RX ORDER — FAMOTIDINE 40 MG/1
1 TABLET TABLET ORAL DAILY
Qty: 90 TABLET | Refills: 3 | Status: ACTIVE | COMMUNITY

## 2025-03-17 RX ORDER — METFORMIN HYDROCHLORIDE 500 MG/1
TAKE 1 TABLET (1,000 MG) BY ORAL ROUTE 2 TIMES PER DAY WITH MORNING AND EVENING MEALS TABLET, FILM COATED ORAL 2
Qty: 0 | Refills: 0 | Status: ACTIVE | COMMUNITY
Start: 1900-01-01

## 2025-03-17 RX ORDER — AZELASTINE HYDROCHLORIDE 205.5 UG/1
2 SPRAYS IN EACH NOSTRIL SPRAY, METERED NASAL
Status: ACTIVE | COMMUNITY

## 2025-03-17 RX ORDER — ESOMEPRAZOLE MAGNESIUM 20 MG/1
1 CAPSULE CAPSULE, DELAYED RELEASE ORAL ONCE A DAY
Qty: 90 CAPSULE | Refills: 3 | Status: ACTIVE | COMMUNITY

## 2025-03-17 RX ORDER — ATORVASTATIN CALCIUM 20 MG/1
TAKE 1 TABLET (20 MG) BY ORAL ROUTE ONCE DAILY TABLET, FILM COATED ORAL 1
Qty: 0 | Refills: 0 | Status: ACTIVE | COMMUNITY
Start: 1900-01-01

## 2025-03-17 RX ORDER — ASPIRIN 81 MG/1
TAKE 1 TABLET (81 MG) BY ORAL ROUTE ONCE DAILY TABLET, COATED ORAL 1
Qty: 0 | Refills: 0 | Status: ACTIVE | COMMUNITY
Start: 1900-01-01

## 2025-03-17 RX ORDER — CHROMIUM 200 MCG
1 TABLET TABLET ORAL
Status: ACTIVE | COMMUNITY

## 2025-03-17 NOTE — HPI-TODAY'S VISIT:
3/5/2020  Patrick presents for colonoscopy follow up. Her Colonoscopy reveals a benign polyp and normal random biopsies. She continues to struggle with 3-4 loose Bms in the am. She thinks it is related to the prilosec 40mg daily. She would like to wean off but is on NSAIDs PRN for recent joint replacement. She is not taking anything for the diarrhea. She has no symptoms of reflux on omeprazole. Today she is struggling with her symptoms. MB   6/29/2020 Patrick presents for follow up of reflux and IBS-D. Since her last visit she stopped PPI and is on famotidine 40mg BID with control of her symptoms. She has an occasional breakthrough when she over eats. This and the florastor have helped her diarrhea but she still has intermittent episodes of loose stool and urgency. Today she is doing better but still with some diarrhea. MB  8/10/2020 Patrick presents for follow up of reflux and IBS-D. She is doing well on famotidine 40mg daily. She is s/p xifaxan with improvement initially but recurrent symptoms. She is on florastor everyother day due to cost. Today she is doing well otherwise but still struggling with her diarrhea. MB  11/2/2020 Mrs. Glover presents for follow-up of reflux, IBS-D, and fatty liver.  Since her last visit she has been doing well.  We did repeat the Xifaxan which helped her diarrhea.  She still has occasional flares every 1 to 3 weeks, she has not used the Imodium yet.  The IBgard seem to increase her reflux, she is doing well on famotidine 40 mg twice daily.  Today she is doing well otherwise with no new GI complaints, but she is working on weight loss for her fatty liver, she has repeat lab work scheduled this winter with Dr. Minor.  MB   5/3/2021 Rox presents for follow-up of reflux and irritable bowel syndrome.  Since her last visit she has been doing about the same.  She is on Pepcid 40 mg twice daily.  She only has occasional heartburn breakthrough when she overeats.  Her bowels have been fairly normal but she still has days of urgency and diarrhea with 1-3 bowel movements in the morning.  She does think the Florastor is helpful.  Xifaxan improves her symptoms if she is flaring.  She agrees her diet likely plays a role and would like to look into the FODMAP diet.  Today she is doing well with no new GI complaints.  MB  5/11/2022 Rox presents for follow-up of reflux, diarrhea, and fatty liver.  Since her last visit she has had a flare of epigastric abdominal pain after meals with nausea bloating and reflux.  She is on famotidine 40 mg twice daily.  She does still have her gallbladder.  She is a diabetic.  She was started on Ozempic 2 months ago but her symptoms were increased before that.  She is never had an upper endoscopy by Dr. Vincent, she may have had one in the distant past by Dr. Cast.  She is never had a gastric emptying study.  Today she is her main complaint is postprandial bloating nausea and epigastric abdominal pain.  She does agree to start low-dose PPI, these have bothered her diarrhea in the past.  Her diarrhea is stable and Florastor daily.  Her last colonoscopy was in 2019 by Dr. Vincent with normal random biopsies and otherwise normal.  MB  6/15/2022 Rox presents for follow up of reflux, diarrhea, and fatty liver with cirrhosis. Since her last visit her US shows cirrhosis. Her contrasted CT confirms this. Her EGD reveals reflux and gastritis. Her GES is positive. She has not had her serologies yet. She has no sequela of the disease so far. She is meeting with nutrition for her gastroparesis this week. Her reflux is stable on pepcid 40mg and nexium in the am. Her stools are loose but at her baseline. Today she is doing well and wants to know if she should continue ozempic as it is likely contributing to her nausea and diarrhea. She wants to give this more time as it has helped her A1C significantly and try to mange her GP with nutrition. MB  This telehealth visit was provided at the patient's home. 7/14/2022 Rox presents for follow-up of reflux, diabetic gastroparesis, and psoriasis.  Since her last visit she has been doing better on Ozempic.  She is cut back on very small meals and is down 22 pounds overall.  Her chronic serologies were negative.  Her meld is 7.  Today we had a long discussion regarding advanced liver disease.  I want to encourage her to continue to work on weight loss, tight blood sugar control, and cholesterol management.  Her liver enzymes are mildly elevated.  She has met with a nutritionist and her gastroparesis overall is doing somewhat better.  She continues to decline any further medications for this.  Her reflux is stable on Nexium in the morning and famotidine at night.  Today overall she is doing fairly well, she has several questions regarding liver disease which we reviewed.  She agrees to see me every 6 months with ultrasound imaging and lab work.  I have encouraged her to continue management of her cholesterol and blood sugar closely with Dr. Minor.  Otherwise she is doing well today.  MB 1/12/2023 Rox presents for follow-up of reflux, gastroparesis, and fatty liver cirrhosis.  Her repeat liver enzymes have normalized, she is lost 50 pounds total in the past 2 years and she remains on Ozempic.  Her reflux is stable on PPI and Pepcid.  Her gastroparesis symptoms have improved, she remains on Ozempic and is eating small frequent meals.  Her bowels are moving regularly.  She has no sequela of her liver disease including no signs of encephalopathy ascites or portal hypertension.  Her EGD this year shows no varices.  Her HCC screening in December shows no new lesions.  Today she is doing well with no new GI complaints.  MB 7/13/2023 Rox presents for follow-up of end-stage liver disease secondary to fatty liver with thrombocytopenia.  Since her last visit her repeat ultrasound is stable with cirrhotic findings no new lesions.  Her synthetic function is normal other than mild thrombocytopenia.  Her liver enzymes have normalized.  Her bowels are moving fairly regularly.  She is not taking the Florastor regularly and has occasional diarrhea but not regularly.  Her reflux is stable and she is only taking the Nexium and Pepcid just as needed.  Today she continues to work on weight loss.  She is off Ozempic and maintaining her weight along with her blood sugars.  She denies any new GI complaints.  MB 1/25/2024 Rox presents for follow-up of fatty liver cirrhosis.  Her repeat MRI is stable with cirrhotic morphology of the liver, her lab work is stable with stable platelets and bilirubin.  She is maintaining her weight loss.  She is due for colonoscopy this coming December, we will repeat EGD with [facility]'s screening at that time.  Today she denies any new GI complaints.  MB 8/20/2024 The patient presents for follow-up of NAFLD D cirrhosis.  Since her last visit she is reported increased fatigue and dark stools.  Her labs last month showed no anemia but elevated BUN/creatinine ratio.  She is on low-dose Nexium which she is taking every other day alternating with famotidine 40 mg every other day.  She is due for EGD for [facility]'s screening this fall with her colonoscopy.  Her synthetic function remains normal.  Her bowels are somewhat loose, she is taking Florastor but not consistently.  Today she is due for EGD and colonoscopy this fall we will set this up care.  Given her fatigue and dark stools we will check her labs again along with her ammonia B12 and iron studies.  Consider further workup pending her results.  MB 3/17/2025 Rox presents for follow-up.  Since her last visit she has been doing well.  Her repeat MRI shows stable liver, she does have a 12 mm pancreatic cystic lesion that appears to be an IPMN versus serous cystadenoma, this looks like it is stable compared with imaging from 2023.  We will plan to repeat this in 1 year.  Her reflux is stable on as omeprazole 20 in the morning and famotidine at night.  She has gained weight on GLP-1 therapy.  I have recommended she discuss Zepbound with her primary care physician.  She is due for EGD and colonoscopy and scheduled in June.  She continues to have episodes of proctalgia, okay to use Levsin as needed.  Today she is doing well otherwise with no new GI complaints.  MB

## 2025-05-12 ENCOUNTER — WEB ENCOUNTER (OUTPATIENT)
Dept: URBAN - NONMETROPOLITAN AREA CLINIC 2 | Facility: CLINIC | Age: 65
End: 2025-05-12

## 2025-05-12 RX ORDER — ESOMEPRAZOLE MAGNESIUM 20 MG/1
1 CAPSULE CAPSULE, DELAYED RELEASE ORAL ONCE A DAY
Qty: 90 CAPSULE | Refills: 3

## 2025-05-21 ENCOUNTER — WEB ENCOUNTER (OUTPATIENT)
Dept: URBAN - NONMETROPOLITAN AREA CLINIC 2 | Facility: CLINIC | Age: 65
End: 2025-05-21

## 2025-06-12 ENCOUNTER — OFFICE VISIT (OUTPATIENT)
Dept: URBAN - METROPOLITAN AREA MEDICAL CENTER 1 | Facility: MEDICAL CENTER | Age: 65
End: 2025-06-12
Payer: COMMERCIAL

## 2025-06-12 ENCOUNTER — LAB OUTSIDE AN ENCOUNTER (OUTPATIENT)
Dept: URBAN - NONMETROPOLITAN AREA CLINIC 2 | Facility: CLINIC | Age: 65
End: 2025-06-12

## 2025-06-12 DIAGNOSIS — K31.89 OTHER DISEASES OF STOMACH AND DUODENUM: ICD-10-CM

## 2025-06-12 DIAGNOSIS — K22.89 OTHER SPECIFIED DISEASE OF ESOPHAGUS: ICD-10-CM

## 2025-06-12 DIAGNOSIS — Z86.0100 HISTORY OF COLON POLYPS: ICD-10-CM

## 2025-06-12 DIAGNOSIS — R10.13 EPIGASTRIC PAIN: ICD-10-CM

## 2025-06-12 PROCEDURE — G0105 COLORECTAL SCRN; HI RISK IND: HCPCS | Performed by: INTERNAL MEDICINE

## 2025-06-12 PROCEDURE — 43239 EGD BIOPSY SINGLE/MULTIPLE: CPT | Performed by: INTERNAL MEDICINE

## 2025-06-12 PROCEDURE — 0529F INTRVL 3/>YR PTS CLNSCP DOCD: CPT | Performed by: INTERNAL MEDICINE

## 2025-06-12 RX ORDER — HYOSCYAMINE SULFATE 0.12 MG/1
1 TABLET UNDER THE TONGUE AND ALLOW TO DISSOLVE AS NEEDED FOR SPASM TABLET SUBLINGUAL THREE TIMES A DAY
Qty: 90 TABLET | Refills: 3 | Status: ACTIVE | COMMUNITY
Start: 2025-03-17 | End: 2025-07-15

## 2025-06-12 RX ORDER — METFORMIN HYDROCHLORIDE 500 MG/1
TAKE 1 TABLET (1,000 MG) BY ORAL ROUTE 2 TIMES PER DAY WITH MORNING AND EVENING MEALS TABLET, FILM COATED ORAL 2
Qty: 0 | Refills: 0 | Status: ACTIVE | COMMUNITY
Start: 1900-01-01

## 2025-06-12 RX ORDER — AZELASTINE HYDROCHLORIDE 205.5 UG/1
2 SPRAYS IN EACH NOSTRIL SPRAY, METERED NASAL
Status: ACTIVE | COMMUNITY

## 2025-06-12 RX ORDER — IRBESARTAN AND HYDROCHLOROTHIAZIDE 150; 12.5 MG/1; MG/1
TAKE 2 TABLETS BY ORAL ROUTE ONCE DAILY TABLET ORAL 1
Qty: 0 | Refills: 0 | Status: ACTIVE | COMMUNITY
Start: 1900-01-01

## 2025-06-12 RX ORDER — FAMOTIDINE 40 MG/1
1 TABLET TABLET ORAL DAILY
Qty: 90 TABLET | Refills: 3 | Status: ACTIVE | COMMUNITY

## 2025-06-12 RX ORDER — CHROMIUM 200 MCG
1 TABLET TABLET ORAL
Status: ACTIVE | COMMUNITY

## 2025-06-12 RX ORDER — ASPIRIN 81 MG/1
TAKE 1 TABLET (81 MG) BY ORAL ROUTE ONCE DAILY TABLET, COATED ORAL 1
Qty: 0 | Refills: 0 | Status: ACTIVE | COMMUNITY
Start: 1900-01-01

## 2025-06-12 RX ORDER — ATORVASTATIN CALCIUM 20 MG/1
TAKE 1 TABLET (20 MG) BY ORAL ROUTE ONCE DAILY TABLET, FILM COATED ORAL 1
Qty: 0 | Refills: 0 | Status: ACTIVE | COMMUNITY
Start: 1900-01-01

## 2025-06-12 RX ORDER — ESOMEPRAZOLE MAGNESIUM 20 MG/1
1 CAPSULE CAPSULE, DELAYED RELEASE ORAL ONCE A DAY
Qty: 90 CAPSULE | Refills: 3 | Status: ACTIVE | COMMUNITY

## 2025-06-13 LAB
AP CASE REPORT: (no result)
AP FINAL DIAGNOSIS: (no result)
AP GROSS DESCRIPTION: (no result)
AP MICROSCOPIC DESCRIPTION: (no result)

## 2025-06-17 ENCOUNTER — LAB OUTSIDE AN ENCOUNTER (OUTPATIENT)
Dept: URBAN - NONMETROPOLITAN AREA CLINIC 2 | Facility: CLINIC | Age: 65
End: 2025-06-17

## 2025-06-17 LAB
A/G RATIO: 1.5
ALBUMIN: 4.5
ALKALINE PHOSPHATASE: 70
ALT (SGPT): 24
ANION GAP: 12
AST (SGOT): 27
BASOPHILS AUTOMATED ABSOLUTE COUNT: 0.1
BASOPHILS RELATIVE PERCENT: 1.3
BILIRUBIN TOTAL: 1
BLOOD UREA NITROGEN: 23
BUN / CREAT RATIO: 41
CALCIUM: 9.7
CHLORIDE: 101
CO2: 30
CREATININE, SERUM: 0.56
EGFR (CKD-EPI): >60
EOSINOPHILS AUTOMATED ABSOLUTE COUNT: 0.2
EOSINOPHILS RELATIVE PERCENT: 2.8
GLUCOSE: 109
HEMATOCRIT: 38.9
HEMOGLOBIN: 13
INR: 0.99
LYMPHOCYTES AUTOMATED ABSOLUTE COUNT: 1.8
LYMPHOCYTES RELATIVE PERCENT: 30.2
MEAN CORPUSCULAR HEMOGLOBIN CONC: 33.5
MEAN CORPUSCULAR HEMOGLOBIN: 29.8
MEAN CORPUSCULAR VOLUME: 89
MEAN PLATELET VOLUME: 9.9
MONOCYTES AUTOMATED ABSOLUTE COUNT: 0.4
MONOCYTES RELATIVE PERCENT: 7.3
NEUTROPHILS AUTOMATED ABSOLUTE: 3.4
NEUTROPHILS RELATIVE PERCENT: 58.4
PLATELET COUNT: 128
POTASSIUM: 4.2
PROTEIN TOTAL: 7.6
PROTHROMBIN TIME: 11.1
RED BLOOD CELL COUNT: 4.37
RED CELL DISTRIBUTION WIDTH: 13.9
SODIUM: 139
WHITE BLOOD CELL COUNT: 5.8

## 2025-06-22 ENCOUNTER — TELEPHONE ENCOUNTER (OUTPATIENT)
Dept: URBAN - NONMETROPOLITAN AREA CLINIC 2 | Facility: CLINIC | Age: 65
End: 2025-06-22

## 2025-06-23 ENCOUNTER — WEB ENCOUNTER (OUTPATIENT)
Dept: URBAN - NONMETROPOLITAN AREA CLINIC 2 | Facility: CLINIC | Age: 65
End: 2025-06-23

## 2025-06-24 ENCOUNTER — WEB ENCOUNTER (OUTPATIENT)
Dept: URBAN - NONMETROPOLITAN AREA CLINIC 2 | Facility: CLINIC | Age: 65
End: 2025-06-24

## 2025-06-30 ENCOUNTER — WEB ENCOUNTER (OUTPATIENT)
Dept: URBAN - NONMETROPOLITAN AREA CLINIC 2 | Facility: CLINIC | Age: 65
End: 2025-06-30

## 2025-08-27 ENCOUNTER — OFFICE VISIT (OUTPATIENT)
Dept: URBAN - NONMETROPOLITAN AREA CLINIC 2 | Facility: CLINIC | Age: 65
End: 2025-08-27
Payer: COMMERCIAL

## 2025-08-27 ENCOUNTER — TELEPHONE ENCOUNTER (OUTPATIENT)
Dept: URBAN - NONMETROPOLITAN AREA CLINIC 2 | Facility: CLINIC | Age: 65
End: 2025-08-27

## 2025-08-27 DIAGNOSIS — K74.60 HEPATIC CIRRHOSIS, UNSPECIFIED HEPATIC CIRRHOSIS TYPE, UNSPECIFIED WHETHER ASCITES PRESENT: ICD-10-CM

## 2025-08-27 DIAGNOSIS — K59.4 PROCTALGIA FUGAX: ICD-10-CM

## 2025-08-27 DIAGNOSIS — K75.81 METABOLIC DYSFUNCTION-ASSOCIATED STEATOHEPATITIS (MASH): ICD-10-CM

## 2025-08-27 DIAGNOSIS — K64.9 HEMORRHOIDS, UNSPECIFIED HEMORRHOID TYPE: ICD-10-CM

## 2025-08-27 DIAGNOSIS — K86.2 PANCREATIC CYST: ICD-10-CM

## 2025-08-27 DIAGNOSIS — K31.84 GASTROPARESIS: ICD-10-CM

## 2025-08-27 DIAGNOSIS — K21.9 GERD (GASTROESOPHAGEAL REFLUX DISEASE): ICD-10-CM

## 2025-08-27 DIAGNOSIS — Z86.0101 PERSONAL HISTORY OF ADENOMATOUS AND SERRATED COLON POLYPS: ICD-10-CM

## 2025-08-27 DIAGNOSIS — R19.7 DIARRHEA: ICD-10-CM

## 2025-08-27 PROBLEM — 442685003: Status: ACTIVE | Noted: 2025-08-27

## 2025-08-27 PROCEDURE — 99214 OFFICE O/P EST MOD 30 MIN: CPT | Performed by: NURSE PRACTITIONER

## 2025-08-27 RX ORDER — ASPIRIN 81 MG/1
TAKE 1 TABLET (81 MG) BY ORAL ROUTE ONCE DAILY TABLET, COATED ORAL 1
Qty: 0 | Refills: 0 | Status: ACTIVE | COMMUNITY
Start: 1900-01-01

## 2025-08-27 RX ORDER — ATORVASTATIN CALCIUM 20 MG/1
TAKE 1 TABLET (20 MG) BY ORAL ROUTE ONCE DAILY TABLET, FILM COATED ORAL 1
Qty: 0 | Refills: 0 | Status: ACTIVE | COMMUNITY
Start: 1900-01-01

## 2025-08-27 RX ORDER — METFORMIN HYDROCHLORIDE 500 MG/1
TAKE 1 TABLET (1,000 MG) BY ORAL ROUTE 2 TIMES PER DAY WITH MORNING AND EVENING MEALS TABLET, FILM COATED ORAL 2
Qty: 0 | Refills: 0 | Status: ACTIVE | COMMUNITY
Start: 1900-01-01

## 2025-08-27 RX ORDER — IRBESARTAN AND HYDROCHLOROTHIAZIDE 150; 12.5 MG/1; MG/1
TAKE 2 TABLETS BY ORAL ROUTE ONCE DAILY TABLET ORAL 1
Qty: 0 | Refills: 0 | Status: ACTIVE | COMMUNITY
Start: 1900-01-01

## 2025-08-27 RX ORDER — ESOMEPRAZOLE MAGNESIUM 20 MG/1
1 CAPSULE CAPSULE, DELAYED RELEASE ORAL ONCE A DAY
Qty: 90 CAPSULE | Refills: 3 | Status: ACTIVE | COMMUNITY

## 2025-08-27 RX ORDER — OMEPRAZOLE 40 MG/1
TAKE 1 CAPSULE BY ORAL ROUTE EVERY OTHER DAY CAPSULE, DELAYED RELEASE PELLETS ORAL
Qty: 0 | Refills: 0 | Status: DISCONTINUED | COMMUNITY
Start: 1900-01-01

## 2025-08-27 RX ORDER — RIFAXIMIN 550 MG/1
1 TABLET TABLET ORAL THREE TIMES A DAY
Qty: 42 TABLET | Refills: 0 | OUTPATIENT
Start: 2025-08-27 | End: 2025-09-10

## 2025-08-27 RX ORDER — MULTIVITAMIN WITH IRON
TAKE 1 TABLET BY ORAL ROUTE DAILY TABLET ORAL 1
Qty: 0 | Refills: 0 | Status: DISCONTINUED | COMMUNITY
Start: 1900-01-01

## 2025-08-27 RX ORDER — FAMOTIDINE 40 MG/1
1 TABLET TABLET ORAL DAILY
Qty: 90 TABLET | Refills: 3 | Status: ACTIVE | COMMUNITY

## 2025-08-27 RX ORDER — IBUPROFEN 400 MG/1
TABLET ORAL
Qty: 0 | Refills: 0 | Status: DISCONTINUED | COMMUNITY
Start: 1900-01-01

## 2025-08-27 RX ORDER — CLINDAMYCIN HYDROCHLORIDE 300 MG/1
TAKE 2 CAPSULES BY MOUTH BEFORE PROCEDURE CAPSULE ORAL
Qty: 0 | Refills: 0 | Status: DISCONTINUED | COMMUNITY
Start: 1900-01-01

## 2025-08-27 RX ORDER — CHROMIUM 200 MCG
1 TABLET TABLET ORAL
Status: DISCONTINUED | COMMUNITY

## 2025-08-27 RX ORDER — MELATONIN 3 MG
1 TABLET AT BEDTIME AS NEEDED TABLET ORAL ONCE A DAY
Status: ACTIVE | COMMUNITY

## 2025-08-27 RX ORDER — ACETAMINOPHEN 500 MG/1
TABLET, FILM COATED ORAL
Qty: 0 | Refills: 0 | Status: DISCONTINUED | COMMUNITY
Start: 1900-01-01

## 2025-08-27 RX ORDER — AZELASTINE HYDROCHLORIDE 205.5 UG/1
2 SPRAYS IN EACH NOSTRIL SPRAY, METERED NASAL
Status: ACTIVE | COMMUNITY

## 2025-08-27 RX ORDER — DEXTROSE 4 G
TAKE 1 TABLET (10 MG) BY ORAL ROUTE ONCE DAILY TABLET,CHEWABLE ORAL 1
Qty: 0 | Refills: 0 | Status: DISCONTINUED | COMMUNITY
Start: 1900-01-01

## 2025-08-27 RX ORDER — FLUTICASONE PROPIONATE 50 UG/1
SPRAY 2 SPRAYS (100 MCG) IN EACH NOSTRIL BY INTRANASAL ROUTE ONCE DAILY AS NEEDED SPRAY, METERED NASAL 1
Qty: 1 | Refills: 0 | Status: DISCONTINUED | COMMUNITY
Start: 1900-01-01